# Patient Record
Sex: FEMALE | Race: BLACK OR AFRICAN AMERICAN | NOT HISPANIC OR LATINO | Employment: UNEMPLOYED | URBAN - METROPOLITAN AREA
[De-identification: names, ages, dates, MRNs, and addresses within clinical notes are randomized per-mention and may not be internally consistent; named-entity substitution may affect disease eponyms.]

---

## 2021-03-09 ENCOUNTER — HISTORICAL (OUTPATIENT)
Dept: ADMINISTRATIVE | Facility: HOSPITAL | Age: 76
End: 2021-03-09

## 2023-03-15 ENCOUNTER — OFFICE VISIT (OUTPATIENT)
Dept: FAMILY MEDICINE | Facility: CLINIC | Age: 78
End: 2023-03-15
Payer: MEDICARE

## 2023-03-15 VITALS
OXYGEN SATURATION: 100 % | HEART RATE: 86 BPM | SYSTOLIC BLOOD PRESSURE: 116 MMHG | HEIGHT: 65 IN | TEMPERATURE: 98 F | DIASTOLIC BLOOD PRESSURE: 68 MMHG | BODY MASS INDEX: 31 KG/M2 | RESPIRATION RATE: 18 BRPM | WEIGHT: 186.06 LBS

## 2023-03-15 DIAGNOSIS — Z11.59 NEED FOR HEPATITIS B SCREENING TEST: ICD-10-CM

## 2023-03-15 DIAGNOSIS — F01.A3 MILD VASCULAR DEMENTIA WITH MOOD DISTURBANCE: ICD-10-CM

## 2023-03-15 DIAGNOSIS — R41.9 UNSPECIFIED SYMPTOMS AND SIGNS INVOLVING COGNITIVE FUNCTIONS AND AWARENESS: ICD-10-CM

## 2023-03-15 DIAGNOSIS — E78.5 HYPERLIPIDEMIA, UNSPECIFIED HYPERLIPIDEMIA TYPE: ICD-10-CM

## 2023-03-15 DIAGNOSIS — G47.00 INSOMNIA, UNSPECIFIED: ICD-10-CM

## 2023-03-15 DIAGNOSIS — Z13.29 THYROID DISORDER SCREENING: ICD-10-CM

## 2023-03-15 DIAGNOSIS — E55.9 VITAMIN D DEFICIENCY: ICD-10-CM

## 2023-03-15 DIAGNOSIS — D64.9 ANEMIA, UNSPECIFIED TYPE: ICD-10-CM

## 2023-03-15 DIAGNOSIS — E11.51 TYPE 2 DIABETES MELLITUS WITH DIABETIC PERIPHERAL ANGIOPATHY WITHOUT GANGRENE, WITHOUT LONG-TERM CURRENT USE OF INSULIN: Primary | ICD-10-CM

## 2023-03-15 DIAGNOSIS — I10 PRIMARY HYPERTENSION: ICD-10-CM

## 2023-03-15 DIAGNOSIS — Z11.59 NEED FOR HEPATITIS C SCREENING TEST: ICD-10-CM

## 2023-03-15 LAB
ALBUMIN SERPL-MCNC: 4.2 G/DL (ref 3.4–4.8)
ALBUMIN/GLOB SERPL: 1.1 RATIO (ref 1.1–2)
ALP SERPL-CCNC: 47 UNIT/L (ref 40–150)
ALT SERPL-CCNC: 10 UNIT/L (ref 0–55)
APPEARANCE UR: CLEAR
AST SERPL-CCNC: 15 UNIT/L (ref 5–34)
BACTERIA #/AREA URNS AUTO: ABNORMAL /HPF
BASOPHILS # BLD AUTO: 0.03 X10(3)/MCL (ref 0–0.2)
BASOPHILS NFR BLD AUTO: 0.5 %
BILIRUB UR QL STRIP.AUTO: NEGATIVE MG/DL
BILIRUBIN DIRECT+TOT PNL SERPL-MCNC: 0.5 MG/DL
BUN SERPL-MCNC: 16.5 MG/DL (ref 9.8–20.1)
CALCIUM SERPL-MCNC: 10.1 MG/DL (ref 8.4–10.2)
CHLORIDE SERPL-SCNC: 104 MMOL/L (ref 98–107)
CHOLEST SERPL-MCNC: 377 MG/DL
CHOLEST/HDLC SERPL: 7 {RATIO} (ref 0–5)
CO2 SERPL-SCNC: 26 MMOL/L (ref 23–31)
COLOR UR AUTO: ABNORMAL
CREAT SERPL-MCNC: 0.88 MG/DL (ref 0.55–1.02)
CREAT UR-MCNC: 172.6 MG/DL (ref 47–110)
DEPRECATED CALCIDIOL+CALCIFEROL SERPL-MC: 35.9 NG/ML (ref 30–80)
EOSINOPHIL # BLD AUTO: 0.04 X10(3)/MCL (ref 0–0.9)
EOSINOPHIL NFR BLD AUTO: 0.7 %
ERYTHROCYTE [DISTWIDTH] IN BLOOD BY AUTOMATED COUNT: 16 % (ref 11.5–17)
EST. AVERAGE GLUCOSE BLD GHB EST-MCNC: 114 MG/DL
GFR SERPLBLD CREATININE-BSD FMLA CKD-EPI: >60 MLS/MIN/1.73/M2
GLOBULIN SER-MCNC: 3.7 GM/DL (ref 2.4–3.5)
GLUCOSE SERPL-MCNC: 91 MG/DL (ref 82–115)
GLUCOSE UR QL STRIP.AUTO: NORMAL MG/DL
HBA1C MFR BLD: 5.6 %
HBV CORE AB SERPL QL IA: NONREACTIVE
HBV SURFACE AG SERPL QL IA: NONREACTIVE
HCT VFR BLD AUTO: 31.9 % (ref 37–47)
HCV AB SERPL QL IA: NONREACTIVE
HDLC SERPL-MCNC: 51 MG/DL (ref 35–60)
HGB BLD-MCNC: 10.5 G/DL (ref 12–16)
HIV 1+2 AB+HIV1 P24 AG SERPL QL IA: NONREACTIVE
HYALINE CASTS #/AREA URNS LPF: ABNORMAL /LPF
IMM GRANULOCYTES # BLD AUTO: 0.01 X10(3)/MCL (ref 0–0.04)
IMM GRANULOCYTES NFR BLD AUTO: 0.2 %
KETONES UR QL STRIP.AUTO: NEGATIVE MG/DL
LDLC SERPL CALC-MCNC: 280 MG/DL (ref 50–140)
LEUKOCYTE ESTERASE UR QL STRIP.AUTO: NEGATIVE UNIT/L
LYMPHOCYTES # BLD AUTO: 1.11 X10(3)/MCL (ref 0.6–4.6)
LYMPHOCYTES NFR BLD AUTO: 18.7 %
MCH RBC QN AUTO: 26.8 PG
MCHC RBC AUTO-ENTMCNC: 32.9 G/DL (ref 33–36)
MCV RBC AUTO: 81.4 FL (ref 80–94)
MICROALBUMIN UR-MCNC: 47.8 UG/ML
MICROALBUMIN/CREAT RATIO PNL UR: 27.7 MG/GM CR (ref 0–30)
MONOCYTES # BLD AUTO: 0.43 X10(3)/MCL (ref 0.1–1.3)
MONOCYTES NFR BLD AUTO: 7.2 %
MUCOUS THREADS URNS QL MICRO: ABNORMAL /LPF
NEUTROPHILS # BLD AUTO: 4.32 X10(3)/MCL (ref 2.1–9.2)
NEUTROPHILS NFR BLD AUTO: 72.7 %
NITRITE UR QL STRIP.AUTO: NEGATIVE
NRBC BLD AUTO-RTO: 0 %
PH UR STRIP.AUTO: 6 [PH]
PLATELET # BLD AUTO: 350 X10(3)/MCL (ref 130–400)
PMV BLD AUTO: 9.7 FL (ref 7.4–10.4)
POTASSIUM SERPL-SCNC: 4.1 MMOL/L (ref 3.5–5.1)
PROT SERPL-MCNC: 7.9 GM/DL (ref 5.8–7.6)
PROT UR QL STRIP.AUTO: ABNORMAL MG/DL
RBC # BLD AUTO: 3.92 X10(6)/MCL (ref 4.2–5.4)
RBC #/AREA URNS AUTO: ABNORMAL /HPF
RBC UR QL AUTO: NEGATIVE UNIT/L
SODIUM SERPL-SCNC: 138 MMOL/L (ref 136–145)
SP GR UR STRIP.AUTO: 1.02
SQUAMOUS #/AREA URNS LPF: ABNORMAL /HPF
T PALLIDUM AB SER QL: NONREACTIVE
TRIGL SERPL-MCNC: 232 MG/DL (ref 37–140)
TSH SERPL-ACNC: 1.85 UIU/ML (ref 0.35–4.94)
UROBILINOGEN UR STRIP-ACNC: NORMAL MG/DL
VIT B12 SERPL-MCNC: 476 PG/ML (ref 213–816)
VLDLC SERPL CALC-MCNC: 46 MG/DL
WBC # SPEC AUTO: 5.9 X10(3)/MCL (ref 4.5–11.5)
WBC #/AREA URNS AUTO: ABNORMAL /HPF

## 2023-03-15 PROCEDURE — 80061 LIPID PANEL: CPT | Performed by: FAMILY MEDICINE

## 2023-03-15 PROCEDURE — 86780 TREPONEMA PALLIDUM: CPT | Performed by: FAMILY MEDICINE

## 2023-03-15 PROCEDURE — 84443 ASSAY THYROID STIM HORMONE: CPT | Performed by: FAMILY MEDICINE

## 2023-03-15 PROCEDURE — 82043 UR ALBUMIN QUANTITATIVE: CPT | Performed by: FAMILY MEDICINE

## 2023-03-15 PROCEDURE — 80053 COMPREHEN METABOLIC PANEL: CPT | Performed by: FAMILY MEDICINE

## 2023-03-15 PROCEDURE — 82607 VITAMIN B-12: CPT | Performed by: FAMILY MEDICINE

## 2023-03-15 PROCEDURE — 87389 HIV-1 AG W/HIV-1&-2 AB AG IA: CPT | Performed by: FAMILY MEDICINE

## 2023-03-15 PROCEDURE — 81001 URINALYSIS AUTO W/SCOPE: CPT | Performed by: FAMILY MEDICINE

## 2023-03-15 PROCEDURE — 87340 HEPATITIS B SURFACE AG IA: CPT | Performed by: FAMILY MEDICINE

## 2023-03-15 PROCEDURE — 86803 HEPATITIS C AB TEST: CPT | Performed by: FAMILY MEDICINE

## 2023-03-15 PROCEDURE — 36415 COLL VENOUS BLD VENIPUNCTURE: CPT | Performed by: FAMILY MEDICINE

## 2023-03-15 PROCEDURE — 82306 VITAMIN D 25 HYDROXY: CPT | Performed by: FAMILY MEDICINE

## 2023-03-15 PROCEDURE — 99214 OFFICE O/P EST MOD 30 MIN: CPT | Mod: PBBFAC | Performed by: FAMILY MEDICINE

## 2023-03-15 PROCEDURE — 83036 HEMOGLOBIN GLYCOSYLATED A1C: CPT | Performed by: FAMILY MEDICINE

## 2023-03-15 PROCEDURE — 85025 COMPLETE CBC W/AUTO DIFF WBC: CPT | Performed by: FAMILY MEDICINE

## 2023-03-15 PROCEDURE — 86704 HEP B CORE ANTIBODY TOTAL: CPT | Performed by: FAMILY MEDICINE

## 2023-03-15 RX ORDER — AMLODIPINE BESYLATE 5 MG/1
TABLET ORAL
COMMUNITY
Start: 2023-02-28 | End: 2023-12-06 | Stop reason: SDUPTHER

## 2023-03-15 RX ORDER — ATORVASTATIN CALCIUM 10 MG/1
10 TABLET, FILM COATED ORAL NIGHTLY
COMMUNITY
End: 2023-04-05 | Stop reason: SDUPTHER

## 2023-03-15 RX ORDER — CITALOPRAM 20 MG/1
TABLET, FILM COATED ORAL
COMMUNITY
Start: 2023-02-28 | End: 2023-10-10 | Stop reason: SDUPTHER

## 2023-03-15 RX ORDER — ASPIRIN 81 MG/1
81 TABLET ORAL DAILY
COMMUNITY

## 2023-03-15 RX ORDER — METFORMIN HYDROCHLORIDE 500 MG/1
500 TABLET, EXTENDED RELEASE ORAL
COMMUNITY
Start: 2023-03-07 | End: 2023-10-10 | Stop reason: ALTCHOICE

## 2023-03-15 RX ORDER — MIRTAZAPINE 15 MG/1
15 TABLET, FILM COATED ORAL NIGHTLY PRN
COMMUNITY
Start: 2023-02-28 | End: 2023-12-06 | Stop reason: SDUPTHER

## 2023-03-15 RX ORDER — BENAZEPRIL HYDROCHLORIDE 20 MG/1
TABLET ORAL
COMMUNITY
Start: 2023-02-28 | End: 2023-12-06 | Stop reason: SDUPTHER

## 2023-03-15 NOTE — PROGRESS NOTES
[facility]  [facility] Geriatric Clinic Note    DOS: 3/15/2023      Subjective:  Chief Complaint:    Chief Complaint   Patient presents with    Establish Care     Back pain.       History of Present Illness:  Gladys Dominguez is a 78 y.o. female with a PMH of HTN, HLD, DM2, depression, insomnia, chronic low back pain, CAD, chronic atrial fibrillation s/p internal subcutaneous device placement 2020  PVD with R femoral stent 2020    Who presents to geriatric clinic today for:  Establishing Care - New patient     Patient feels well and doing well overall.  Lives independently and otherwise independent of ADLs/IADLs.    Patient states she does feel forgetful of small things like where she places items, or why she walked into room.   Admits to forgetting to take medication from pill box some times.   She states she does not forget appointments,  remembers names.   She denies getting lost driving, but has now given up driving 2 years ago due to taxing on her energy, leg pain, cardiac condition, and vision.   Lives with granddaughter who drives her for all appointments and errands.      Has some pain with walking and mild swelling to right lower leg, she feels that is all due to her prior femoral stent.  Has not followed with vascular doctor recently.   Dr. Stephen Lau - cardiologist, patient states she has an implantable device with her atrial fibrillation, but does not know what it is, when questioned of watchman she states it did not sound familiar.  Patient does not have typical device location or palpation of AICD on exam.  Has upcoming appointment with them.     Past Medical History:   Diagnosis Date    Diabetes mellitus     Hypertension       History reviewed. No pertinent surgical history.   History reviewed. No pertinent family history.   Social History     Socioeconomic History    Marital status:     Number of children: 5   Tobacco Use    Smoking status: Never     Passive  exposure: Never    Smokeless tobacco: Never   Substance and Sexual Activity    Alcohol use: Never    Drug use: Never    Sexual activity: Not Currently        Review of patient's allergies indicates:  No Known Allergies       Current Outpatient Medications:     amLODIPine (NORVASC) 5 MG tablet, , Disp: , Rfl:     aspirin (ECOTRIN) 81 MG EC tablet, Take 81 mg by mouth once daily., Disp: , Rfl:     atorvastatin (LIPITOR) 10 MG tablet, Take 10 mg by mouth every evening., Disp: , Rfl:     benazepriL (LOTENSIN) 20 MG tablet, , Disp: , Rfl:     citalopram (CELEXA) 20 MG tablet, , Disp: , Rfl:     metFORMIN (GLUCOPHAGE-XR) 500 MG ER 24hr tablet, Take 500 mg by mouth., Disp: , Rfl:     mirtazapine (REMERON) 15 MG tablet, 15 mg nightly as needed., Disp: , Rfl:      Review of Systems   Constitutional:  Positive for weight loss. Negative for chills, diaphoresis, fever and malaise/fatigue.   HENT:  Negative for congestion, ear pain, hearing loss, sore throat and tinnitus.    Respiratory:  Positive for shortness of breath. Negative for cough, sputum production and wheezing.         Dyspnea on exertion   Cardiovascular:  Positive for claudication and leg swelling. Negative for chest pain and palpitations.        Right leg with stent placement ,intermittent   Gastrointestinal:  Negative for abdominal pain, blood in stool, constipation, diarrhea, heartburn, melena, nausea and vomiting.   Genitourinary:  Negative for dysuria, frequency, hematuria and urgency.   Musculoskeletal:  Positive for back pain, falls, joint pain and myalgias.        Left shoulder, has prior fracture    Near falls 3 weeks ago - tripped over grandsons shoes   Skin:  Negative for itching and rash.   Neurological:  Negative for dizziness, weakness and headaches.   Psychiatric/Behavioral:  Positive for memory loss. Negative for depression and suicidal ideas. The patient is not nervous/anxious and does not have insomnia.       Objective:   Vitals:    03/15/23 1318  "  BP: 116/68   BP Location: Left arm   Patient Position: Sitting   BP Method: Large (Automatic)   Pulse: 86   Resp: 18   Temp: 98.1 °F (36.7 °C)   TempSrc: Oral   SpO2: 100%   Weight: 84.4 kg (186 lb 1.1 oz)   Height: 5' 5" (1.651 m)        Physical Exam  Constitutional:       General: She is not in acute distress.     Appearance: Normal appearance. She is obese.   HENT:      Head: Normocephalic and atraumatic.      Right Ear: External ear normal.      Left Ear: External ear normal.      Nose: Nose normal. No congestion or rhinorrhea.      Mouth/Throat:      Mouth: Mucous membranes are moist.      Pharynx: Oropharynx is clear.   Eyes:      Extraocular Movements: Extraocular movements intact.      Conjunctiva/sclera: Conjunctivae normal.      Pupils: Pupils are equal, round, and reactive to light.   Cardiovascular:      Rate and Rhythm: Normal rate and regular rhythm.      Pulses: Normal pulses.      Heart sounds: Normal heart sounds. No murmur heard.  Pulmonary:      Effort: Pulmonary effort is normal. No respiratory distress.      Breath sounds: Normal breath sounds. No wheezing, rhonchi or rales.   Abdominal:      General: Bowel sounds are normal. There is no distension.      Palpations: Abdomen is soft.      Tenderness: There is no abdominal tenderness.   Musculoskeletal:         General: No tenderness. Normal range of motion.      Cervical back: Normal range of motion and neck supple.      Right lower leg: Edema present.      Left lower leg: No edema.   Skin:     General: Skin is warm and dry.   Neurological:      General: No focal deficit present.      Mental Status: She is alert and oriented to person, place, and time. Mental status is at baseline.      Motor: No weakness.   Psychiatric:         Mood and Affect: Mood normal.         Behavior: Behavior normal.    Mild 1+ pitting edema to above ankle in right leg only, left leg wnl      Geriatric Assessment:     Activities of Daily Living (ADLs):  Walking " independent  Transferring independent  Feeding independent  Bathing independent  Toileting independent  Dressing/Grooming independent    Instrumental Activities of Daily Living (IADLs):  Finances independent  Transportation fully dependent - granddaughter  Cooking independent  Cleaning/Laundry independent  Shopping independent  Telephone / Communication independent  Medications independent  Has pill box, admits she has forgets a couple times   Discussed granddaughter should help or or supervise her arrangement of meds in the box and help with daily reminders, plus put reminders on phone    Cognitive Assessment:  SLUMS performed date: 3/15/23 and scanned to patient's chart in media, Score 17/30  Mild dementia, otherwise functionally well    Depression Assessment:   Depression Patient Health Questionnaire 3/15/2023   Over the last two weeks how often have you been bothered by little interest or pleasure in doing things Not at all   Over the last two weeks how often have you been bothered by feeling down, depressed or hopeless Not at all   PHQ-2 Total Score 0       Mobility Assessment:   - Timed Up and Go (10 ft < 12 secs): Able  - Sit to  chair x 3 (without using arms): Not able  - Tandem stance and gait: Not able  - semi Tandem stance and gait: Able  - Side by Side stance (> 10 secs): Able  - One Legged stance: Not able  - Functional Reach (>7in/18cm): Not able    Assistive Devices:   none  Glasses: yes - pending cataract surgery this year   Hearing Aids: no  Dentures: yes    Social support/Living Situation: 5 Grandchildren live with her and help with IADLs in 1 story single house 3 bedroom    Polypharmacy Identified? (>9 meds) no    Advanced Care Planning:  - [place] POST: not done yet, counseled patient and information provided  - Medical POA: not done yet, counseled patient and information provided    Recent labs:  CBC:  Lab Results   Component Value Date    WBC 5.9 03/15/2023    RBC 3.92 (L) 03/15/2023    HGB  10.5 (L) 03/15/2023    HCT 31.9 (L) 03/15/2023    MCV 81.4 03/15/2023    MCH 26.8 03/15/2023    MCHC 32.9 (L) 03/15/2023    RDW 16.0 03/15/2023     03/15/2023    MPV 9.7 03/15/2023      CMP:  Sodium Level   Date Value Ref Range Status   03/15/2023 138 136 - 145 mmol/L Final     Potassium Level   Date Value Ref Range Status   03/15/2023 4.1 3.5 - 5.1 mmol/L Final     Carbon Dioxide   Date Value Ref Range Status   03/15/2023 26 23 - 31 mmol/L Final     Blood Urea Nitrogen   Date Value Ref Range Status   03/15/2023 16.5 9.8 - 20.1 mg/dL Final     Creatinine   Date Value Ref Range Status   03/15/2023 0.88 0.55 - 1.02 mg/dL Final     Calcium Level Total   Date Value Ref Range Status   03/15/2023 10.1 8.4 - 10.2 mg/dL Final     Albumin Level   Date Value Ref Range Status   03/15/2023 4.2 3.4 - 4.8 g/dL Final     Bilirubin Total   Date Value Ref Range Status   03/15/2023 0.5 <=1.5 mg/dL Final     Alkaline Phosphatase   Date Value Ref Range Status   03/15/2023 47 40 - 150 unit/L Final     Aspartate Aminotransferase   Date Value Ref Range Status   03/15/2023 15 5 - 34 unit/L Final     Alanine Aminotransferase   Date Value Ref Range Status   03/15/2023 10 0 - 55 unit/L Final      BMP:  Lab Results   Component Value Date     03/15/2023    K 4.1 03/15/2023    CO2 26 03/15/2023    BUN 16.5 03/15/2023    CREATININE 0.88 03/15/2023    CALCIUM 10.1 03/15/2023      Lipid Panel:  Lab Results   Component Value Date    CHOL 377 (H) 03/15/2023     Lab Results   Component Value Date    HDL 51 03/15/2023     No results found for: LDLCALC  Lab Results   Component Value Date    TRIG 232 (H) 03/15/2023     No results found for: CHOLHDL   HbA1c:  Lab Results   Component Value Date    HGBA1C 5.6 03/15/2023      TSH:  Lab Results   Component Value Date    TSH 1.854 03/15/2023       Recent Imaging:  No image results found.       Assessment & Plan:    Gladys Dominguez is presenting as above and will be treated as follows:    Gladys was seen  today for establish care.    Diagnoses and all orders for this visit:    Type 2 diabetes mellitus with diabetic peripheral angiopathy without gangrene, without long-term current use of insulin  -     Comprehensive Metabolic Panel; Future  -     Lipid Panel; Future  -     Hemoglobin A1C; Future  -     Microalbumin/Creatinine Ratio, Urine  -     Urinalysis, Reflex to Urine Culture  -     Hemoglobin A1C  -     Lipid Panel  -     Comprehensive Metabolic Panel    Mild vascular dementia with mood disturbance  -     CBC Auto Differential; Future  -     Comprehensive Metabolic Panel; Future  -     TSH; Future  -     Lipid Panel; Future  -     Hemoglobin A1C; Future  -     Vitamin D; Future  -     Vitamin B12; Future  -     HIV 1/2 Ag/Ab (4th Gen); Future  -     SYPHILIS ANTIBODY (WITH REFLEX RPR); Future  -     Hepatitis B Surface Antigen; Future  -     Hepatitis B Core Antibody, Total; Future  -     Hepatitis C Antibody; Future  -     Hepatitis C Antibody  -     Hepatitis B Core Antibody, Total  -     Hepatitis B Surface Antigen  -     SYPHILIS ANTIBODY (WITH REFLEX RPR)  -     HIV 1/2 Ag/Ab (4th Gen)  -     Vitamin B12  -     Vitamin D  -     Hemoglobin A1C  -     Lipid Panel  -     TSH  -     Comprehensive Metabolic Panel  -     CBC Auto Differential  Continue ASA 81mg daily     Vitamin D deficiency  -     Vitamin D; Future  -     Vitamin D    Primary hypertension  -     Urinalysis, Reflex to Urine Culture    Need for hepatitis C screening test  -     Hepatitis C Antibody; Future  -     Hepatitis C Antibody    Need for hepatitis B screening test  -     Hepatitis B Surface Antigen; Future  -     Hepatitis B Core Antibody, Total; Future  -     Hepatitis B Core Antibody, Total  -     Hepatitis B Surface Antigen    Hyperlipidemia, unspecified hyperlipidemia type  -     Lipid Panel; Future  -     Lipid Panel  Significantly elevated total chol, LDL, and TG noted.  Discussed with patient, she admits she was not previously  compliant on priov Atorvastatin 10mg dose.    We discussed with the level of lipids she has, 10mg even with compliance likely would not be enough, so increased to 40mg.    Will recheck lipids at next 3 month visit, if still significantly elevated, will increase to 80 vs switch to crestor 20mg, given CAD and PVD already, needs tighter control.      Thyroid disorder screening  -     TSH; Future  -     TSH    Insomnia, unspecified  -     TSH; Future  -     TSH    Unspecified symptoms and signs involving cognitive functions and awareness  -     Vitamin B12; Future  -     Vitamin B12    Anemia, unspecified type       Lab results reviewed after the visit, anemia noted, will need iron panel at next visit, B12 normal, also patient is due for follow up colonoscopy from prior screening, has GI specialist and will set up follow up appointment.   3 month follow up alainat     Nany Hansen MD  Attending - Family Medicine / Geriatric Medicine  [facility] [place], [facility]      Health Maintenance Reviewed:    There is no immunization history on file for this patient.

## 2023-04-06 RX ORDER — ATORVASTATIN CALCIUM 80 MG/1
80 TABLET, FILM COATED ORAL NIGHTLY
Qty: 90 TABLET | Refills: 1 | Status: SHIPPED | OUTPATIENT
Start: 2023-04-06 | End: 2023-12-06 | Stop reason: SDUPTHER

## 2023-06-19 ENCOUNTER — OFFICE VISIT (OUTPATIENT)
Dept: FAMILY MEDICINE | Facility: CLINIC | Age: 78
End: 2023-06-19
Payer: MEDICARE

## 2023-06-19 VITALS
DIASTOLIC BLOOD PRESSURE: 69 MMHG | OXYGEN SATURATION: 100 % | HEART RATE: 92 BPM | TEMPERATURE: 98 F | RESPIRATION RATE: 20 BRPM | WEIGHT: 188.94 LBS | SYSTOLIC BLOOD PRESSURE: 118 MMHG | BODY MASS INDEX: 31.48 KG/M2 | HEIGHT: 65 IN

## 2023-06-19 DIAGNOSIS — K64.4 INTERNAL AND EXTERNAL BLEEDING HEMORRHOIDS: ICD-10-CM

## 2023-06-19 DIAGNOSIS — R22.42 LOCALIZED SWELLING OF LEFT LOWER LEG: ICD-10-CM

## 2023-06-19 DIAGNOSIS — K62.5 BRIGHT RED RECTAL BLEEDING: Primary | ICD-10-CM

## 2023-06-19 DIAGNOSIS — K64.8 INTERNAL AND EXTERNAL BLEEDING HEMORRHOIDS: ICD-10-CM

## 2023-06-19 DIAGNOSIS — E78.5 HYPERLIPIDEMIA, UNSPECIFIED HYPERLIPIDEMIA TYPE: ICD-10-CM

## 2023-06-19 DIAGNOSIS — E86.0 DEHYDRATION: ICD-10-CM

## 2023-06-19 LAB
ANION GAP SERPL CALC-SCNC: 9 MEQ/L
BASOPHILS # BLD AUTO: 0.03 X10(3)/MCL
BASOPHILS NFR BLD AUTO: 0.5 %
BUN SERPL-MCNC: 18.7 MG/DL (ref 9.8–20.1)
CALCIUM SERPL-MCNC: 9.6 MG/DL (ref 8.4–10.2)
CHLORIDE SERPL-SCNC: 105 MMOL/L (ref 98–107)
CHOLEST SERPL-MCNC: 275 MG/DL
CHOLEST/HDLC SERPL: 7 {RATIO} (ref 0–5)
CO2 SERPL-SCNC: 25 MMOL/L (ref 23–31)
CREAT SERPL-MCNC: 0.94 MG/DL (ref 0.55–1.02)
CREAT/UREA NIT SERPL: 20
D DIMER PPP IA.FEU-MCNC: 1.28 UG/ML FEU (ref 0–0.5)
EOSINOPHIL # BLD AUTO: 0.06 X10(3)/MCL (ref 0–0.9)
EOSINOPHIL NFR BLD AUTO: 1.1 %
ERYTHROCYTE [DISTWIDTH] IN BLOOD BY AUTOMATED COUNT: 16.6 % (ref 11.5–17)
FERRITIN SERPL-MCNC: 148.32 NG/ML (ref 4.63–204)
GFR SERPLBLD CREATININE-BSD FMLA CKD-EPI: >60 MLS/MIN/1.73/M2
GLUCOSE SERPL-MCNC: 120 MG/DL (ref 82–115)
HCT VFR BLD AUTO: 28.6 % (ref 37–47)
HDLC SERPL-MCNC: 41 MG/DL (ref 35–60)
HGB BLD-MCNC: 9.6 G/DL (ref 12–16)
IMM GRANULOCYTES # BLD AUTO: 0.02 X10(3)/MCL (ref 0–0.04)
IMM GRANULOCYTES NFR BLD AUTO: 0.4 %
IRON SATN MFR SERPL: 23 % (ref 20–50)
IRON SERPL-MCNC: 58 UG/DL (ref 50–170)
LDLC SERPL CALC-MCNC: 194 MG/DL (ref 50–140)
LYMPHOCYTES # BLD AUTO: 1.09 X10(3)/MCL (ref 0.6–4.6)
LYMPHOCYTES NFR BLD AUTO: 19.6 %
MCH RBC QN AUTO: 27.4 PG (ref 27–31)
MCHC RBC AUTO-ENTMCNC: 33.6 G/DL (ref 33–36)
MCV RBC AUTO: 81.7 FL (ref 80–94)
MONOCYTES # BLD AUTO: 0.46 X10(3)/MCL (ref 0.1–1.3)
MONOCYTES NFR BLD AUTO: 8.3 %
NEUTROPHILS # BLD AUTO: 3.89 X10(3)/MCL (ref 2.1–9.2)
NEUTROPHILS NFR BLD AUTO: 70.1 %
NRBC BLD AUTO-RTO: 0 %
PLATELET # BLD AUTO: 361 X10(3)/MCL (ref 130–400)
PMV BLD AUTO: 10.1 FL (ref 7.4–10.4)
POTASSIUM SERPL-SCNC: 3.8 MMOL/L (ref 3.5–5.1)
RBC # BLD AUTO: 3.5 X10(6)/MCL (ref 4.2–5.4)
SODIUM SERPL-SCNC: 139 MMOL/L (ref 136–145)
TIBC SERPL-MCNC: 195 UG/DL (ref 70–310)
TIBC SERPL-MCNC: 253 UG/DL (ref 250–450)
TRANSFERRIN SERPL-MCNC: 213 MG/DL (ref 173–360)
TRIGL SERPL-MCNC: 199 MG/DL (ref 37–140)
VLDLC SERPL CALC-MCNC: 40 MG/DL
WBC # SPEC AUTO: 5.55 X10(3)/MCL (ref 4.5–11.5)

## 2023-06-19 PROCEDURE — 85025 COMPLETE CBC W/AUTO DIFF WBC: CPT | Performed by: FAMILY MEDICINE

## 2023-06-19 PROCEDURE — 82728 ASSAY OF FERRITIN: CPT | Performed by: FAMILY MEDICINE

## 2023-06-19 PROCEDURE — 36415 COLL VENOUS BLD VENIPUNCTURE: CPT | Performed by: FAMILY MEDICINE

## 2023-06-19 PROCEDURE — 83550 IRON BINDING TEST: CPT | Performed by: FAMILY MEDICINE

## 2023-06-19 PROCEDURE — 85379 FIBRIN DEGRADATION QUANT: CPT | Performed by: FAMILY MEDICINE

## 2023-06-19 PROCEDURE — 80048 BASIC METABOLIC PNL TOTAL CA: CPT | Performed by: FAMILY MEDICINE

## 2023-06-19 PROCEDURE — 80061 LIPID PANEL: CPT | Performed by: FAMILY MEDICINE

## 2023-06-19 PROCEDURE — 99214 OFFICE O/P EST MOD 30 MIN: CPT | Mod: PBBFAC | Performed by: FAMILY MEDICINE

## 2023-06-19 RX ORDER — HYDROCORTISONE ACETATE 25 MG/1
25 SUPPOSITORY RECTAL NIGHTLY
Qty: 24 SUPPOSITORY | Refills: 0 | Status: SHIPPED | OUTPATIENT
Start: 2023-06-19 | End: 2023-12-06

## 2023-06-19 RX ORDER — HYDROCORTISONE 25 MG/G
CREAM TOPICAL 2 TIMES DAILY
Qty: 20 G | Refills: 1 | Status: SHIPPED | OUTPATIENT
Start: 2023-06-19 | End: 2023-12-06

## 2023-06-19 NOTE — PROGRESS NOTES
"  [facility]    DOS: 6/19/2023      Subjective:  Chief Complaint:    Chief Complaint   Patient presents with    Diabetes     C/O rectal bleeding       History of Present Illness:  Gladys Dominguez is a 78 y.o. female with a PMH of HTN, HLD, depression, DM, insomnia.    Who presents today for:  Acute Concerns:  rectal bleeding and Follow up of Chronic Conditions: HTN, HLD    Patient had episode of rectal bleeding in the shower approx 1 month ago in may.   Several drops of blood that streamed In the drain, no BM or stool associated.  Initially thought it could be hemorrhoids, occasionally has bright red blood when wiping herself after Bms and has chronic constipation with aggravation recently.   Granddaughter examined her after this episode and thought she saw hemorrhoids/bulging from anus but no active bleeding. Denies pain with straining or other abdominal pain.  Has gas from constipation, taking dulcolax otc every 2 days which helps.  Drinking extra water to stay hydrated.    Has not had further bleeding episode since.   Denies dizziness or lightheadedness after, but notes feeling "winded" when doing strenuous or exertional activities.     Patient also known to have history of diverticulitis in the past and has not had colonoscopy in > 5 years.  Daughter previously told patient when they were supposed to have follow up study, there was a problem with the insurance and could not get it done.   Has established GI doctor, will make follow up appointment.     Also notes - Left leg swelling x approx 2 weeks, no injury no falls since previous visit.  Is sedentary most times in bed or recliner chair but does not lift legs when sitting, watching TV.  Denies pain, redness, or warmth,  but occasional cramping, but that is both legs and it improves with walking and rubbing/massaging muscles.    Of note has R leg femoral stent and follows vascular doctor Dr. Lau, " "cancelled previous follow up so needs to make another appointment. .       Past Medical History:   Diagnosis Date    Diabetes mellitus     Diabetes mellitus, type 2     Hypertension       No past surgical history on file.   No family history on file.   Social History     Socioeconomic History    Marital status:     Number of children: 3   Occupational History    Occupation: retired   Tobacco Use    Smoking status: Never     Passive exposure: Never    Smokeless tobacco: Never   Substance and Sexual Activity    Alcohol use: Never    Drug use: Never    Sexual activity: Not Currently     Partners: Male        Review of patient's allergies indicates:  No Known Allergies     Current Outpatient Medications:     amLODIPine (NORVASC) 5 MG tablet, , Disp: , Rfl:     aspirin (ECOTRIN) 81 MG EC tablet, Take 81 mg by mouth once daily., Disp: , Rfl:     atorvastatin (LIPITOR) 80 MG tablet, Take 1 tablet (80 mg total) by mouth every evening., Disp: 90 tablet, Rfl: 1    benazepriL (LOTENSIN) 20 MG tablet, , Disp: , Rfl:     citalopram (CELEXA) 20 MG tablet, , Disp: , Rfl:     metFORMIN (GLUCOPHAGE-XR) 500 MG ER 24hr tablet, Take 500 mg by mouth., Disp: , Rfl:     mirtazapine (REMERON) 15 MG tablet, 15 mg nightly as needed., Disp: , Rfl:      Review of Systems:  Review of Systems   Constitutional:  Negative for chills, fever and weight loss.   HENT:  Negative for congestion, hearing loss and sore throat.    Eyes:  Negative for blurred vision.   Respiratory:  Negative for cough, sputum production and shortness of breath.         Feels "winded" on exertion but not shortness of breath per say    Cardiovascular:  Positive for leg swelling. Negative for chest pain and palpitations.   Gastrointestinal:  Positive for blood in stool and constipation. Negative for abdominal pain, diarrhea, heartburn, nausea and vomiting.   Genitourinary:  Negative for dysuria, frequency and urgency.   Musculoskeletal:  Positive for myalgias. Negative " "for back pain, falls and joint pain.   Neurological:  Positive for weakness. Negative for dizziness, focal weakness and headaches.   Psychiatric/Behavioral:  Negative for depression and memory loss. The patient is not nervous/anxious and does not have insomnia.       Objective:   Vitals:    06/19/23 1155   BP: 118/69   BP Location: Right arm   Patient Position: Sitting   BP Method: Large (Automatic)   Pulse: 92   Resp: 20   Temp: 98.2 °F (36.8 °C)   TempSrc: Oral   SpO2: 100%   Weight: 85.7 kg (188 lb 15 oz)   Height: 5' 5" (1.651 m)        Physical Exam  Vitals reviewed. Exam conducted with a chaperone present.   Constitutional:       General: She is not in acute distress.     Appearance: Normal appearance. She is obese.   HENT:      Head: Normocephalic and atraumatic.      Right Ear: External ear normal.      Left Ear: External ear normal.      Nose: Nose normal. No congestion or rhinorrhea.      Mouth/Throat:      Mouth: Mucous membranes are moist.      Pharynx: Oropharynx is clear. No oropharyngeal exudate or posterior oropharyngeal erythema.   Eyes:      Extraocular Movements: Extraocular movements intact.      Conjunctiva/sclera: Conjunctivae normal.      Pupils: Pupils are equal, round, and reactive to light.   Cardiovascular:      Rate and Rhythm: Tachycardia present.      Pulses: Normal pulses.      Heart sounds: Normal heart sounds. No murmur heard.     Comments: HR 92 but fast on auscultative exam.   Pulmonary:      Effort: Pulmonary effort is normal. No respiratory distress.      Breath sounds: Normal breath sounds. No wheezing or rhonchi.   Abdominal:      General: Abdomen is flat. Bowel sounds are normal. There is no distension.      Palpations: Abdomen is soft.      Tenderness: There is no abdominal tenderness.   Genitourinary:     Exam position: Knee-chest position.      Rectum: External hemorrhoid and internal hemorrhoid present. No mass, tenderness or anal fissure. Normal anal tone.      Comments: " External hemorrhoid with skin tag and mild bulging to approx 10oclock position.  No erythema or tenderness, Small amount of stool residual on exam - normal mid brown color not black no bright red blood noted.   Musculoskeletal:         General: Swelling present. No tenderness. Normal range of motion.      Cervical back: Normal range of motion and neck supple.      Right lower leg: No edema.      Left lower leg: Edema present.   Skin:     General: Skin is warm and dry.      Capillary Refill: Capillary refill takes less than 2 seconds.   Neurological:      General: No focal deficit present.      Mental Status: She is alert and oriented to person, place, and time. Mental status is at baseline.      Motor: No weakness.   Psychiatric:         Mood and Affect: Mood normal.         Behavior: Behavior normal.         Thought Content: Thought content normal.         Judgment: Judgment normal.        Recent labs:  CBC:  Lab Results   Component Value Date    WBC 5.9 03/15/2023    RBC 3.92 (L) 03/15/2023    HGB 10.5 (L) 03/15/2023    HCT 31.9 (L) 03/15/2023    MCV 81.4 03/15/2023    MCH 26.8 03/15/2023    MCHC 32.9 (L) 03/15/2023    RDW 16.0 03/15/2023     03/15/2023    MPV 9.7 03/15/2023      CMP:  Sodium Level   Date Value Ref Range Status   03/15/2023 138 136 - 145 mmol/L Final     Potassium Level   Date Value Ref Range Status   03/15/2023 4.1 3.5 - 5.1 mmol/L Final     Carbon Dioxide   Date Value Ref Range Status   03/15/2023 26 23 - 31 mmol/L Final     Blood Urea Nitrogen   Date Value Ref Range Status   03/15/2023 16.5 9.8 - 20.1 mg/dL Final     Creatinine   Date Value Ref Range Status   03/15/2023 0.88 0.55 - 1.02 mg/dL Final     Calcium Level Total   Date Value Ref Range Status   03/15/2023 10.1 8.4 - 10.2 mg/dL Final     Albumin Level   Date Value Ref Range Status   03/15/2023 4.2 3.4 - 4.8 g/dL Final     Bilirubin Total   Date Value Ref Range Status   03/15/2023 0.5 <=1.5 mg/dL Final     Alkaline Phosphatase    Date Value Ref Range Status   03/15/2023 47 40 - 150 unit/L Final     Aspartate Aminotransferase   Date Value Ref Range Status   03/15/2023 15 5 - 34 unit/L Final     Alanine Aminotransferase   Date Value Ref Range Status   03/15/2023 10 0 - 55 unit/L Final      BMP:  Lab Results   Component Value Date     03/15/2023    K 4.1 03/15/2023    CO2 26 03/15/2023    BUN 16.5 03/15/2023    CREATININE 0.88 03/15/2023    CALCIUM 10.1 03/15/2023      Lipid Panel:  Lab Results   Component Value Date    CHOL 377 (H) 03/15/2023     Lab Results   Component Value Date    HDL 51 03/15/2023     No results found for: LDLCALC  Lab Results   Component Value Date    TRIG 232 (H) 03/15/2023     No results found for: CHOLHDL   HbA1c:  Lab Results   Component Value Date    HGBA1C 5.6 03/15/2023      TSH:  Lab Results   Component Value Date    TSH 1.854 03/15/2023     Assessment & Plan:    Gladys Dominguez is presenting as above and will be treated as follows:    Gladys was seen today for diabetes.    Diagnoses and all orders for this visit:    Bright red rectal bleeding  -     Cancel: CBC Auto Differential; Future  -     Cancel: Basic Metabolic Panel; Future  -     Cancel: Iron and TIBC; Future  -     Cancel: Ferritin; Future  -     Basic Metabolic Panel; Future  -     CBC Auto Differential; Future  -     Ferritin; Future  -     Iron and TIBC; Future  -     hydrocortisone 2.5 % cream; Apply topically 2 (two) times daily.  -     hydrocortisone (ANUSOL-HC) 25 mg suppository; Place 1 suppository (25 mg total) rectally every evening.  -     Iron and TIBC  -     Ferritin  -     CBC Auto Differential  -     Basic Metabolic Panel    Dehydration  -     Cancel: Basic Metabolic Panel; Future  -     Basic Metabolic Panel; Future  -     Basic Metabolic Panel    Localized swelling of left lower leg  -     D-Dimer, Quantitative; Future  -     D-Dimer, Quantitative    Hyperlipidemia, unspecified hyperlipidemia type  -     Lipid Panel; Future  -      Lipid Panel    Internal and external bleeding hemorrhoids  -     hydrocortisone 2.5 % cream; Apply topically 2 (two) times daily.  -     hydrocortisone (ANUSOL-HC) 25 mg suppository; Place 1 suppository (25 mg total) rectally every evening.    Patient to make GI urgent follow up, need colonoscopy for prior diveriticulitis follow up as well as cancer screening,  can possibly get hemorrhoid banding if needed.  ED precautions given if further bleeding episodes, weakness, dizzy/lightheadedness.     Patient to use hemorrhoid cream external and suppository internal x 2 weeks, while awaiting follow up.   For leg swelling, due to incongruency from left to right, but no erythema, warmth, or pain/tenderness on exam - will obtain D-Dimer first, prior to ultrasound to rule out DVT.  Could also be vascular insufficiency as patient had stenting on R but not Left.  Patient notes she gets BLE ultrasounds done with vascular doctor and has upcoming follow up.  Pending blood work results, will recommend patient accordingly.     Health Maintenance Reviewed:    Hepatitis Screening:   Lab Results   Component Value Date    HEPBCAB Nonreactive 03/15/2023    HEPCAB Nonreactive 03/15/2023        Will need records from prior PCP and will do in depth review of health maintenance at following visit.      RTC 6 weeks for rectal bleeding and leg swelling follow up    Nany Hansen MD  Attending - Family Medicine / Geriatric Medicine  [facility] [place], [facility]

## 2023-10-10 ENCOUNTER — OFFICE VISIT (OUTPATIENT)
Dept: FAMILY MEDICINE | Facility: CLINIC | Age: 78
End: 2023-10-10
Payer: MEDICARE

## 2023-10-10 VITALS
TEMPERATURE: 98 F | HEIGHT: 65 IN | HEART RATE: 85 BPM | OXYGEN SATURATION: 100 % | BODY MASS INDEX: 30.85 KG/M2 | WEIGHT: 185.19 LBS | DIASTOLIC BLOOD PRESSURE: 77 MMHG | RESPIRATION RATE: 20 BRPM | SYSTOLIC BLOOD PRESSURE: 137 MMHG

## 2023-10-10 DIAGNOSIS — F41.9 ANXIETY: ICD-10-CM

## 2023-10-10 DIAGNOSIS — Z23 NEED FOR INFLUENZA VACCINATION: Primary | ICD-10-CM

## 2023-10-10 PROCEDURE — G0008 ADMIN INFLUENZA VIRUS VAC: HCPCS | Mod: PBBFAC

## 2023-10-10 PROCEDURE — 90694 VACC AIIV4 NO PRSRV 0.5ML IM: CPT | Mod: PBBFAC

## 2023-10-10 PROCEDURE — 99213 OFFICE O/P EST LOW 20 MIN: CPT | Mod: PBBFAC

## 2023-10-10 RX ORDER — CITALOPRAM 20 MG/1
20 TABLET, FILM COATED ORAL DAILY
Qty: 90 TABLET | Refills: 1 | Status: SHIPPED | OUTPATIENT
Start: 2023-10-10 | End: 2023-12-06 | Stop reason: SDUPTHER

## 2023-10-10 RX ADMIN — INFLUENZA A VIRUS A/VICTORIA/4897/2022 IVR-238 (H1N1) ANTIGEN (FORMALDEHYDE INACTIVATED), INFLUENZA A VIRUS A/DARWIN/6/2021 IVR-227 (H3N2) ANTIGEN (FORMALDEHYDE INACTIVATED), INFLUENZA B VIRUS B/AUSTRIA/1359417/2021 BVR-26 ANTIGEN (FORMALDEHYDE INACTIVATED), INFLUENZA B VIRUS B/PHUKET/3073/2013 BVR-1B ANTIGEN (FORMALDEHYDE INACTIVATED) 0.5 ML: 15; 15; 15; 15 INJECTION, SUSPENSION INTRAMUSCULAR at 03:10

## 2023-10-10 NOTE — PROGRESS NOTES
[facility]    DOS: 10/10/2023      Subjective:  Chief Complaint:    Chief Complaint   Patient presents with    Diabetes    Hypertension    Hyperlipidemia     FU       History of Present Illness:  Gladys Dominguez is a 78 y.o. female with a PMH of HTN, HLD, depression, DM, insomnia.    Who presents today for: follow up    Since last visit rectal bleeding has resolved with medications prescribed at last visit. No more bright blood noted per stool. Additionally lower extremity swelling on the left has improved but does occur intermittently. Otherwise, additionally has complaints of chronic back and shoulder pain that has not changed significantly.        Past Medical History:   Diagnosis Date    Diabetes mellitus     Diabetes mellitus, type 2     Hypertension       No past surgical history on file.   No family history on file.   Social History     Socioeconomic History    Marital status:     Number of children: 3   Occupational History    Occupation: retired   Tobacco Use    Smoking status: Never     Passive exposure: Never    Smokeless tobacco: Never   Substance and Sexual Activity    Alcohol use: Never    Drug use: Never    Sexual activity: Not Currently     Partners: Male        Review of patient's allergies indicates:  No Known Allergies     Current Outpatient Medications:     amLODIPine (NORVASC) 5 MG tablet, , Disp: , Rfl:     aspirin (ECOTRIN) 81 MG EC tablet, Take 81 mg by mouth once daily., Disp: , Rfl:     atorvastatin (LIPITOR) 80 MG tablet, Take 1 tablet (80 mg total) by mouth every evening., Disp: 90 tablet, Rfl: 1    benazepriL (LOTENSIN) 20 MG tablet, , Disp: , Rfl:     citalopram (CELEXA) 20 MG tablet, , Disp: , Rfl:     hydrocortisone (ANUSOL-HC) 25 mg suppository, Place 1 suppository (25 mg total) rectally every evening., Disp: 24 suppository, Rfl: 0    hydrocortisone 2.5 % cream, Apply topically 2 (two) times daily., Disp: 20 g, Rfl: 1    " metFORMIN (GLUCOPHAGE-XR) 500 MG ER 24hr tablet, Take 500 mg by mouth., Disp: , Rfl:     mirtazapine (REMERON) 15 MG tablet, 15 mg nightly as needed., Disp: , Rfl:      Review of Systems:  Review of Systems   Constitutional:  Negative for chills, fever and weight loss.   HENT:  Negative for congestion, hearing loss and sore throat.    Eyes:  Negative for blurred vision.   Respiratory:  Negative for cough, sputum production and shortness of breath.    Cardiovascular:  Negative for chest pain, palpitations and leg swelling.   Gastrointestinal:  Negative for abdominal pain, blood in stool, constipation, diarrhea, heartburn, nausea and vomiting.   Genitourinary:  Negative for dysuria, frequency and urgency.   Musculoskeletal:  Positive for back pain. Negative for falls, joint pain and myalgias.        Bilateral shoulder pain   Neurological:  Negative for dizziness, focal weakness, weakness and headaches.   Psychiatric/Behavioral:  Negative for depression and memory loss. The patient is not nervous/anxious and does not have insomnia.         Objective:   Vitals:    10/10/23 1340   BP: 137/77   BP Location: Right arm   Patient Position: Sitting   BP Method: Large (Automatic)   Pulse: 85   Resp: 20   Temp: 98.4 °F (36.9 °C)   TempSrc: Oral   SpO2: 100%   Weight: 84 kg (185 lb 3 oz)   Height: 5' 5" (1.651 m)        Physical Exam  Vitals reviewed.   Constitutional:       General: She is not in acute distress.     Appearance: Normal appearance. She is obese.   HENT:      Head: Normocephalic and atraumatic.      Mouth/Throat:      Mouth: Mucous membranes are moist.      Pharynx: Oropharynx is clear.   Eyes:      Extraocular Movements: Extraocular movements intact.      Conjunctiva/sclera: Conjunctivae normal.      Pupils: Pupils are equal, round, and reactive to light.   Cardiovascular:      Rate and Rhythm: Normal rate and regular rhythm.      Pulses: Normal pulses.      Heart sounds: Normal heart sounds. No murmur heard.    "  Comments: HR 92 but fast on auscultative exam.   Pulmonary:      Effort: Pulmonary effort is normal. No respiratory distress.      Breath sounds: Normal breath sounds. No wheezing or rhonchi.   Abdominal:      General: Abdomen is flat. Bowel sounds are normal. There is no distension.      Palpations: Abdomen is soft.      Tenderness: There is no abdominal tenderness.   Musculoskeletal:         General: No swelling or tenderness. Normal range of motion.      Cervical back: Normal range of motion.      Right lower leg: No edema.      Left lower leg: No edema.   Skin:     General: Skin is warm and dry.      Capillary Refill: Capillary refill takes less than 2 seconds.   Neurological:      General: No focal deficit present.      Mental Status: She is alert and oriented to person, place, and time. Mental status is at baseline.      Motor: No weakness.   Psychiatric:         Mood and Affect: Mood normal.         Behavior: Behavior normal.         Thought Content: Thought content normal.         Judgment: Judgment normal.        Recent labs:  CBC:  Lab Results   Component Value Date    WBC 5.55 06/19/2023    RBC 3.50 (L) 06/19/2023    HGB 9.6 (L) 06/19/2023    HCT 28.6 (L) 06/19/2023    MCV 81.7 06/19/2023    MCH 27.4 06/19/2023    MCHC 33.6 06/19/2023    RDW 16.6 06/19/2023     06/19/2023    MPV 10.1 06/19/2023      CMP:  Sodium   Date Value Ref Range Status   08/08/2020 137 136 - 145 mmol/L Final     Sodium Level   Date Value Ref Range Status   06/19/2023 139 136 - 145 mmol/L Final     Potassium   Date Value Ref Range Status   08/08/2020 4.4 3.5 - 5.1 mmol/L Final     Potassium Level   Date Value Ref Range Status   06/19/2023 3.8 3.5 - 5.1 mmol/L Final     Chloride   Date Value Ref Range Status   08/08/2020 104 100 - 109 mmol/L Final     Carbon Dioxide   Date Value Ref Range Status   06/19/2023 25 23 - 31 mmol/L Final   08/08/2020 24 22 - 33 mmol/L Final     Blood Urea Nitrogen   Date Value Ref Range Status  "  06/19/2023 18.7 9.8 - 20.1 mg/dL Final   08/08/2020 22 5 - 25 mg/dL Final     Creatinine   Date Value Ref Range Status   06/19/2023 0.94 0.55 - 1.02 mg/dL Final   08/08/2020 0.70 0.57 - 1.25 mg/dL Final     Calcium   Date Value Ref Range Status   08/08/2020 8.6 (L) 8.8 - 10.6 mg/dL Final     Calcium Level Total   Date Value Ref Range Status   06/19/2023 9.6 8.4 - 10.2 mg/dL Final     Albumin Level   Date Value Ref Range Status   03/15/2023 4.2 3.4 - 4.8 g/dL Final     Bilirubin Total   Date Value Ref Range Status   03/15/2023 0.5 <=1.5 mg/dL Final     Alkaline Phosphatase   Date Value Ref Range Status   03/15/2023 47 40 - 150 unit/L Final     Aspartate Aminotransferase   Date Value Ref Range Status   03/15/2023 15 5 - 34 unit/L Final     Alanine Aminotransferase   Date Value Ref Range Status   03/15/2023 10 0 - 55 unit/L Final     Anion Gap   Date Value Ref Range Status   08/08/2020 9 8 - 16 mmol/L Final     eGFR    Date Value Ref Range Status   08/08/2020 99 >=60 mL/min/1.73mSq Final      BMP:  Lab Results   Component Value Date     06/19/2023    K 3.8 06/19/2023     08/08/2020    CO2 25 06/19/2023    BUN 18.7 06/19/2023    CREATININE 0.94 06/19/2023    CALCIUM 9.6 06/19/2023    ANIONGAP 9 08/08/2020    ESTGFRAFRICA 99 08/08/2020      Lipid Panel:  Lab Results   Component Value Date    CHOL 275 (H) 06/19/2023    CHOL 377 (H) 03/15/2023     Lab Results   Component Value Date    HDL 41 06/19/2023    HDL 51 03/15/2023     No results found for: "LDLCALC"  Lab Results   Component Value Date    TRIG 199 (H) 06/19/2023    TRIG 232 (H) 03/15/2023     No results found for: "CHOLHDL"   HbA1c:  Lab Results   Component Value Date    HGBA1C 5.6 03/15/2023      TSH:  Lab Results   Component Value Date    TSH 1.854 03/15/2023     Assessment & Plan:    Gladys Dominguez is presenting as above and will be treated as follows:    Call GI doctor to set appointment.   To get Records from Dr. Chase (Cardiology) " in regards to Ultrasound of Lower extremities  To get records from former PCP for Health maintenance  Educated on stopping ibuprofen (NSAIDS) for chronic pain and to replace with acetaminophen as needed  Can stop Metformin at this point with well controlled sugars and A1c but will regroup at next visit to ensure not trending back up  Continue to to take Lipitor 80. Will regroup on this at next visit as well with repeat Fasting Lipid panel.       Health Maintenance Reviewed:    Hepatitis Screening:   Lab Results   Component Value Date    HEPBCAB Nonreactive 03/15/2023    HEPCAB Nonreactive 03/15/2023        RTC 2mo    Luther Glynn, DO  Internal Medicine - PGY-3

## 2023-10-10 NOTE — PATIENT INSTRUCTIONS
For pain - try NOT to use ibuprofen(Advil) or naproxen (aleve) - it will cause increased bleeding risk.    Safe to use acetaminophen (tylenol) 1,000mg (2 extra strength tabs) twice a day as needed for pain.      Reminder - call your gastroenterology doctor for follow up appointment - ask about when you are due for next colonoscopy, and mention recent bleeding from the rectum with bowel movements and hemorrhoids, and your history of diverticulosis.     Please come fasting in the morning - okay to drink water -  for your labwork before your December appointment

## 2023-11-28 NOTE — PROGRESS NOTES
Date of Service: 10/10/2023    Attending Attestation: Patient discussed with resident. The chart was reviewed thoroughly including pertinent vitals, labs, imaging, medications, prior notes, and consultant/specialist recommendations.  I participated in the management of the patient, examined the patient, reviewed the summary of the plan, and was immediately available at all times throughout the encounter. Services were furnished in a primary care center located in the outpatient department of a [facility] hospital. I agree with the resident's findings and plan as documented in the resident's note.    Nany Hansen MD  Attending - Family Medicine / Geriatric Medicine  [facility] [place], [facility]

## 2023-12-06 ENCOUNTER — OFFICE VISIT (OUTPATIENT)
Dept: FAMILY MEDICINE | Facility: CLINIC | Age: 78
End: 2023-12-06
Payer: MEDICARE

## 2023-12-06 VITALS
TEMPERATURE: 99 F | BODY MASS INDEX: 30.85 KG/M2 | HEIGHT: 65 IN | DIASTOLIC BLOOD PRESSURE: 77 MMHG | SYSTOLIC BLOOD PRESSURE: 135 MMHG | OXYGEN SATURATION: 99 % | WEIGHT: 185.19 LBS | HEART RATE: 74 BPM | RESPIRATION RATE: 20 BRPM

## 2023-12-06 DIAGNOSIS — Z12.31 BREAST CANCER SCREENING BY MAMMOGRAM: ICD-10-CM

## 2023-12-06 DIAGNOSIS — G89.29 CHRONIC MIDLINE LOW BACK PAIN WITHOUT SCIATICA: ICD-10-CM

## 2023-12-06 DIAGNOSIS — E78.00 HIGH CHOLESTEROL: Primary | ICD-10-CM

## 2023-12-06 DIAGNOSIS — F41.9 ANXIETY: ICD-10-CM

## 2023-12-06 DIAGNOSIS — D64.9 ANEMIA, UNSPECIFIED TYPE: ICD-10-CM

## 2023-12-06 DIAGNOSIS — M54.50 CHRONIC MIDLINE LOW BACK PAIN WITHOUT SCIATICA: ICD-10-CM

## 2023-12-06 DIAGNOSIS — I10 ESSENTIAL HYPERTENSION: ICD-10-CM

## 2023-12-06 DIAGNOSIS — E11.51 TYPE 2 DIABETES MELLITUS WITH DIABETIC PERIPHERAL ANGIOPATHY WITHOUT GANGRENE, WITHOUT LONG-TERM CURRENT USE OF INSULIN: ICD-10-CM

## 2023-12-06 DIAGNOSIS — G47.00 INSOMNIA, UNSPECIFIED TYPE: ICD-10-CM

## 2023-12-06 PROBLEM — I21.4 NSTEMI (NON-ST ELEVATED MYOCARDIAL INFARCTION): Status: RESOLVED | Noted: 2020-08-04 | Resolved: 2023-12-06

## 2023-12-06 PROBLEM — I25.2 HISTORY OF MI (MYOCARDIAL INFARCTION): Status: ACTIVE | Noted: 2023-12-06

## 2023-12-06 PROBLEM — I25.10 ARTERIOSCLEROSIS OF CORONARY ARTERY: Status: ACTIVE | Noted: 2023-12-06

## 2023-12-06 PROBLEM — E11.9 DIABETES MELLITUS: Status: ACTIVE | Noted: 2023-12-06

## 2023-12-06 PROBLEM — I21.4 NSTEMI (NON-ST ELEVATED MYOCARDIAL INFARCTION): Status: ACTIVE | Noted: 2020-08-04

## 2023-12-06 PROBLEM — I48.0 PAROXYSMAL ATRIAL FIBRILLATION: Status: ACTIVE | Noted: 2023-12-06

## 2023-12-06 LAB
ALBUMIN SERPL-MCNC: 3.8 G/DL (ref 3.4–4.8)
ALBUMIN/GLOB SERPL: 1.1 RATIO (ref 1.1–2)
ALP SERPL-CCNC: 49 UNIT/L (ref 40–150)
ALT SERPL-CCNC: 8 UNIT/L (ref 0–55)
AST SERPL-CCNC: 15 UNIT/L (ref 5–34)
BASOPHILS # BLD AUTO: 0.03 X10(3)/MCL
BASOPHILS NFR BLD AUTO: 0.6 %
BILIRUB SERPL-MCNC: 0.6 MG/DL
BUN SERPL-MCNC: 17 MG/DL (ref 9.8–20.1)
CALCIUM SERPL-MCNC: 9.4 MG/DL (ref 8.4–10.2)
CHLORIDE SERPL-SCNC: 105 MMOL/L (ref 98–107)
CHOLEST SERPL-MCNC: 234 MG/DL
CHOLEST/HDLC SERPL: 6 {RATIO} (ref 0–5)
CO2 SERPL-SCNC: 28 MMOL/L (ref 23–31)
CREAT SERPL-MCNC: 0.91 MG/DL (ref 0.55–1.02)
EOSINOPHIL # BLD AUTO: 0.06 X10(3)/MCL (ref 0–0.9)
EOSINOPHIL NFR BLD AUTO: 1.2 %
ERYTHROCYTE [DISTWIDTH] IN BLOOD BY AUTOMATED COUNT: 16.5 % (ref 11.5–17)
EST. AVERAGE GLUCOSE BLD GHB EST-MCNC: 122.6 MG/DL
GFR SERPLBLD CREATININE-BSD FMLA CKD-EPI: >60 MLS/MIN/1.73/M2
GLOBULIN SER-MCNC: 3.6 GM/DL (ref 2.4–3.5)
GLUCOSE SERPL-MCNC: 86 MG/DL (ref 82–115)
HBA1C MFR BLD: 5.9 %
HCT VFR BLD AUTO: 32.1 % (ref 37–47)
HDLC SERPL-MCNC: 42 MG/DL (ref 35–60)
HGB BLD-MCNC: 10.3 G/DL (ref 12–16)
IMM GRANULOCYTES # BLD AUTO: 0.01 X10(3)/MCL (ref 0–0.04)
IMM GRANULOCYTES NFR BLD AUTO: 0.2 %
LDLC SERPL CALC-MCNC: 162 MG/DL (ref 50–140)
LYMPHOCYTES # BLD AUTO: 1.05 X10(3)/MCL (ref 0.6–4.6)
LYMPHOCYTES NFR BLD AUTO: 20.2 %
MCH RBC QN AUTO: 26.2 PG (ref 27–31)
MCHC RBC AUTO-ENTMCNC: 32.1 G/DL (ref 33–36)
MCV RBC AUTO: 81.7 FL (ref 80–94)
MONOCYTES # BLD AUTO: 0.4 X10(3)/MCL (ref 0.1–1.3)
MONOCYTES NFR BLD AUTO: 7.7 %
NEUTROPHILS # BLD AUTO: 3.64 X10(3)/MCL (ref 2.1–9.2)
NEUTROPHILS NFR BLD AUTO: 70.1 %
NRBC BLD AUTO-RTO: 0 %
PLATELET # BLD AUTO: 326 X10(3)/MCL (ref 130–400)
PMV BLD AUTO: 10.1 FL (ref 7.4–10.4)
POTASSIUM SERPL-SCNC: 3.9 MMOL/L (ref 3.5–5.1)
PROT SERPL-MCNC: 7.4 GM/DL (ref 5.8–7.6)
RBC # BLD AUTO: 3.93 X10(6)/MCL (ref 4.2–5.4)
SODIUM SERPL-SCNC: 141 MMOL/L (ref 136–145)
TRIGL SERPL-MCNC: 151 MG/DL (ref 37–140)
VLDLC SERPL CALC-MCNC: 30 MG/DL
WBC # SPEC AUTO: 5.19 X10(3)/MCL (ref 4.5–11.5)

## 2023-12-06 PROCEDURE — 99215 OFFICE O/P EST HI 40 MIN: CPT | Mod: PBBFAC | Performed by: FAMILY MEDICINE

## 2023-12-06 PROCEDURE — 83036 HEMOGLOBIN GLYCOSYLATED A1C: CPT | Performed by: FAMILY MEDICINE

## 2023-12-06 PROCEDURE — 85025 COMPLETE CBC W/AUTO DIFF WBC: CPT | Performed by: FAMILY MEDICINE

## 2023-12-06 PROCEDURE — 36415 COLL VENOUS BLD VENIPUNCTURE: CPT | Performed by: FAMILY MEDICINE

## 2023-12-06 PROCEDURE — 80061 LIPID PANEL: CPT | Performed by: FAMILY MEDICINE

## 2023-12-06 PROCEDURE — 80053 COMPREHEN METABOLIC PANEL: CPT | Performed by: FAMILY MEDICINE

## 2023-12-06 RX ORDER — MIRTAZAPINE 15 MG/1
15 TABLET, FILM COATED ORAL NIGHTLY PRN
Qty: 90 TABLET | Refills: 1 | Status: SHIPPED | OUTPATIENT
Start: 2023-12-06

## 2023-12-06 RX ORDER — EZETIMIBE 10 MG/1
10 TABLET ORAL DAILY
Qty: 90 TABLET | Refills: 1 | Status: SHIPPED | OUTPATIENT
Start: 2023-12-06 | End: 2024-06-03

## 2023-12-06 RX ORDER — DICLOFENAC SODIUM 10 MG/G
2 GEL TOPICAL DAILY
Qty: 350 G | Refills: 1 | Status: SHIPPED | OUTPATIENT
Start: 2023-12-06

## 2023-12-06 RX ORDER — BENAZEPRIL HYDROCHLORIDE 20 MG/1
20 TABLET ORAL DAILY
Qty: 90 TABLET | Refills: 1 | Status: SHIPPED | OUTPATIENT
Start: 2023-12-06 | End: 2024-03-06

## 2023-12-06 RX ORDER — CITALOPRAM 20 MG/1
20 TABLET, FILM COATED ORAL DAILY
Qty: 90 TABLET | Refills: 1 | Status: SHIPPED | OUTPATIENT
Start: 2023-12-06

## 2023-12-06 RX ORDER — AMLODIPINE BESYLATE 5 MG/1
5 TABLET ORAL DAILY
Qty: 90 TABLET | Refills: 1 | Status: SHIPPED | OUTPATIENT
Start: 2023-12-06 | End: 2024-03-06 | Stop reason: DRUGHIGH

## 2023-12-06 RX ORDER — ATORVASTATIN CALCIUM 80 MG/1
80 TABLET, FILM COATED ORAL NIGHTLY
Qty: 90 TABLET | Refills: 1 | Status: SHIPPED | OUTPATIENT
Start: 2023-12-06

## 2023-12-06 NOTE — PROGRESS NOTES
[facility]  [facility]    DOS: 2023      Subjective:  Chief Complaint:    Chief Complaint   Patient presents with    Diabetes     2 month FU    Hypertension       History of Present Illness:  Gladys Dominguez is a 78 y.o. female with a PMH of HTN, HLD, depression, DM, insomnia, CAD (MI s/p PCTA 2020 on ASA), hx of PAF (after COVID, completed Eliquis).  Who presents today for: follow up    Acute Issues:   Complaining of mid lower back pain, non-radiating. Chronic, intermittently occurring since years ago. Never tried PT, declines today. Relieved somewhat with Tylenol, girdle, heat/ice. Never had imaging, per patient. Denies LE weakness, numbness, urinary/stool incontinence, trauma/injury to area.     Rectal bleeding- completely resolved. Does have hx of hemorrhoids, diverticulosis on colonoscopy.    Left leg swelling- resolved  S/p R leg femoral stent and follows vascular doctor Dr. Lau, saw him some time this year, states LE US was fine.     Reports getting sad mood around this time of the year with holidays.   3 years ago, mother  this year. Has strong family support. Denies sleep problems, AD, anhedonia, social isolation, SI/HI. On celexa qd for anxiety, and remeron prn for sleep. PHQ9 in office 2      2023    12:48 PM 2023    12:23 PM 2023    11:03 AM 10/10/2023     1:39 PM 2023    11:55 AM 3/15/2023     1:08 PM   Depression Patient Health Questionnaire   Over the last two weeks how often have you been bothered by little interest or pleasure in doing things Not at all Not at all Not at all Not at all Not at all Not at all   Over the last two weeks how often have you been bothered by feeling down, depressed or hopeless More than half the days Several days Several days Not at all Not at all Not at all   PHQ-2 Total Score 2 1 1 0 0 0   Over the last two weeks how often have you been bothered by trouble falling or staying  asleep, or sleeping too much Not at all        Over the last two weeks how often have you been bothered by feeling tired or having little energy Not at all        Over the last two weeks how often have you been bothered by a poor appetite or overeating Not at all        Over the last two weeks how often have you been bothered by feeling bad about yourself - or that you are a failure or have let yourself or your family down Not at all        Over the last two weeks how often have you been bothered by trouble concentrating on things, such as reading the newspaper or watching television Not at all        Over the last two weeks how often have you been bothered by moving or speaking so slowly that other people could have noticed. Or the opposite - being so fidgety or restless that you have been moving around a lot more than usual. Not at all        Over the last two weeks how often have you been bothered by thoughts that you would be better off dead, or of hurting yourself Not at all        If you checked off any problems, how difficult have these problems made it for you to do your work, take care of things at home or get along with other people? Not difficult at all        PHQ-9 Score 2        PHQ-9 Interpretation Minimal or None          Lives with grand-daughter and 5 of her children.  Does not drive anymore  Cooks what she wants for herself   3 years ago  Memory stable, no falls    Health Maintenance:  Lung CA: never smoker  Breast CA: last elodia ~2 yrs ago. Due for mammo  Colon CA: has not made an appt yet. Dr. Cifuentes. Last colonoscopies 5+ years ago per patient. States had 1 polyp. FH of colon CA in father (early age 60's), daughter (40's).   DEXA: states over 2+ years ago, denies any osteopenia. Denies FH of hip fractures. On calcium+vitd3 OTC.   Vaccines: Received Flu last visit. States UTD with Tetanus, Shingles. Received COVID 2021, 2021, 2022.   Not sure of PCV.     Social History     Socioeconomic  History    Marital status:     Number of children: 3   Occupational History    Occupation: retired   Tobacco Use    Smoking status: Never     Passive exposure: Never    Smokeless tobacco: Never   Substance and Sexual Activity    Alcohol use: Never    Drug use: Never    Sexual activity: Not Currently     Partners: Male     Social Determinants of Health     Financial Resource Strain: Low Risk  (12/6/2023)    Overall Financial Resource Strain (CARDIA)     Difficulty of Paying Living Expenses: Not hard at all   Food Insecurity: No Food Insecurity (12/6/2023)    Hunger Vital Sign     Worried About Running Out of Food in the Last Year: Never true     Ran Out of Food in the Last Year: Never true   Transportation Needs: No Transportation Needs (12/6/2023)    PRAPARE - Transportation     Lack of Transportation (Medical): No     Lack of Transportation (Non-Medical): No   Physical Activity: Inactive (12/6/2023)    Exercise Vital Sign     Days of Exercise per Week: 0 days     Minutes of Exercise per Session: 0 min   Stress: No Stress Concern Present (12/6/2023)    [employer] [place] [employer] - Occupational Stress Questionnaire     Feeling of Stress : Not at all   Social Connections: Moderately Isolated (12/6/2023)    Social Connection and Isolation Panel [NHANES]     Frequency of Communication with Friends and Family: More than three times a week     Frequency of Social Gatherings with Friends and Family: Once a week     Attends Voodoo Services: 1 to 4 times per year     Active Member of Clubs or Organizations: No     Attends Club or Organization Meetings: Never     Marital Status:    Housing Stability: Low Risk  (12/6/2023)    Housing Stability Vital Sign     Unable to Pay for Housing in the Last Year: No     Number of Places Lived in the Last Year: 1     Unstable Housing in the Last Year: No        Review of patient's allergies indicates:  No Known Allergies     Current Outpatient Medications:     " amLODIPine (NORVASC) 5 MG tablet, Take 1 tablet (5 mg total) by mouth once daily., Disp: 90 tablet, Rfl: 1    aspirin (ECOTRIN) 81 MG EC tablet, Take 81 mg by mouth once daily., Disp: , Rfl:     atorvastatin (LIPITOR) 80 MG tablet, Take 1 tablet (80 mg total) by mouth every evening., Disp: 90 tablet, Rfl: 1    benazepriL (LOTENSIN) 20 MG tablet, Take 1 tablet (20 mg total) by mouth once daily., Disp: 90 tablet, Rfl: 1    citalopram (CELEXA) 20 MG tablet, Take 1 tablet (20 mg total) by mouth once daily., Disp: 90 tablet, Rfl: 1    diclofenac sodium (VOLTAREN) 1 % Gel, Apply 2 g topically once daily., Disp: 350 g, Rfl: 1    ezetimibe (ZETIA) 10 mg tablet, Take 1 tablet (10 mg total) by mouth once daily., Disp: 90 tablet, Rfl: 1    mirtazapine (REMERON) 15 MG tablet, Take 1 tablet (15 mg total) by mouth nightly as needed., Disp: 90 tablet, Rfl: 1     Review of Systems:  Review of Systems   Constitutional:  Negative for chills, fever, malaise/fatigue and weight loss.   Respiratory:  Negative for shortness of breath.    Cardiovascular:  Negative for chest pain, palpitations and leg swelling.   Gastrointestinal:  Negative for abdominal pain, constipation, diarrhea, nausea and vomiting.   Musculoskeletal:  Positive for back pain. Negative for falls.   Neurological:  Negative for tingling, weakness and headaches.   Psychiatric/Behavioral:  Negative for suicidal ideas. The patient is not nervous/anxious.      Objective:   Vitals:    12/06/23 1106   BP: 135/77   BP Location: Left arm   Patient Position: Sitting   BP Method: Large (Automatic)   Pulse: 74   Resp: 20   Temp: 98.6 °F (37 °C)   TempSrc: Oral   SpO2: 99%   Weight: 84 kg (185 lb 3 oz)   Height: 5' 5" (1.651 m)     General: appears well, in no acute distress   HENT: MMM, oropharynx without erythema/exudate   Neck: supple, no lymphadenopathy, no carotid bruits   Respiratory: clear to auscultation bilaterally, nonlabored respirations   Cardiovascular: regular rate and " rhythm without murmurs or gallops, no edema in bilateral lower extremities   Gastrointestinal: soft, non-tender, non-distended, bowel sounds present   Genitourinary: no suprapubic tenderness   Musculoskeletal: no gross deformities observed. No TTP over back. Straight leg test negative. Back full aROM  Integumentary: warm, dry  Neuro: No focal lesions observed       Recent labs:  CBC:  Lab Results   Component Value Date    WBC 5.19 12/06/2023    RBC 3.93 (L) 12/06/2023    HGB 10.3 (L) 12/06/2023    HCT 32.1 (L) 12/06/2023    MCV 81.7 12/06/2023    MCH 26.2 (L) 12/06/2023    MCHC 32.1 (L) 12/06/2023    RDW 16.5 12/06/2023     12/06/2023    MPV 10.1 12/06/2023      CMP:  Sodium   Date Value Ref Range Status   08/08/2020 137 136 - 145 mmol/L Final     Sodium Level   Date Value Ref Range Status   12/06/2023 141 136 - 145 mmol/L Final     Potassium   Date Value Ref Range Status   08/08/2020 4.4 3.5 - 5.1 mmol/L Final     Potassium Level   Date Value Ref Range Status   12/06/2023 3.9 3.5 - 5.1 mmol/L Final     Chloride   Date Value Ref Range Status   08/08/2020 104 100 - 109 mmol/L Final     Carbon Dioxide   Date Value Ref Range Status   12/06/2023 28 23 - 31 mmol/L Final   08/08/2020 24 22 - 33 mmol/L Final     Blood Urea Nitrogen   Date Value Ref Range Status   12/06/2023 17.0 9.8 - 20.1 mg/dL Final   08/08/2020 22 5 - 25 mg/dL Final     Creatinine   Date Value Ref Range Status   12/06/2023 0.91 0.55 - 1.02 mg/dL Final   08/08/2020 0.70 0.57 - 1.25 mg/dL Final     Calcium   Date Value Ref Range Status   08/08/2020 8.6 (L) 8.8 - 10.6 mg/dL Final     Calcium Level Total   Date Value Ref Range Status   12/06/2023 9.4 8.4 - 10.2 mg/dL Final     Albumin Level   Date Value Ref Range Status   12/06/2023 3.8 3.4 - 4.8 g/dL Final     Bilirubin Total   Date Value Ref Range Status   12/06/2023 0.6 <=1.5 mg/dL Final     Alkaline Phosphatase   Date Value Ref Range Status   12/06/2023 49 40 - 150 unit/L Final     Aspartate  "Aminotransferase   Date Value Ref Range Status   12/06/2023 15 5 - 34 unit/L Final     Alanine Aminotransferase   Date Value Ref Range Status   12/06/2023 8 0 - 55 unit/L Final     Anion Gap   Date Value Ref Range Status   08/08/2020 9 8 - 16 mmol/L Final     eGFR    Date Value Ref Range Status   08/08/2020 99 >=60 mL/min/1.73mSq Final      BMP:  Lab Results   Component Value Date     12/06/2023    K 3.9 12/06/2023     08/08/2020    CO2 28 12/06/2023    BUN 17.0 12/06/2023    CREATININE 0.91 12/06/2023    CALCIUM 9.4 12/06/2023    ANIONGAP 9 08/08/2020    ESTGFRAFRICA 99 08/08/2020      Lipid Panel:  Lab Results   Component Value Date    CHOL 234 (H) 12/06/2023    CHOL 275 (H) 06/19/2023    CHOL 377 (H) 03/15/2023     Lab Results   Component Value Date    HDL 42 12/06/2023    HDL 41 06/19/2023    HDL 51 03/15/2023     No results found for: "LDLCALC"  Lab Results   Component Value Date    TRIG 151 (H) 12/06/2023    TRIG 199 (H) 06/19/2023    TRIG 232 (H) 03/15/2023     No results found for: "CHOLHDL"   HbA1c:  Lab Results   Component Value Date    HGBA1C 5.9 12/06/2023      TSH:  Lab Results   Component Value Date    TSH 1.854 03/15/2023     Assessment & Plan:    Gladys Dominguez is presenting as above and will be treated as follows:    1. High cholesterol  - atorvastatin (LIPITOR) 80 MG tablet; Take 1 tablet (80 mg total) by mouth every evening.  Dispense: 90 tablet; Refill: 1  - Lipid Panel; Future  - ezetimibe (ZETIA) 10 mg tablet; Take 1 tablet (10 mg total) by mouth once daily.  Dispense: 90 tablet; Refill: 1  - Lipid Panel  - start Zetia.   - advised pt to speak with Dr. Watkins about PCSK9 inhibitors. Pt agrees with plan  - continue High intensity statin    2. Essential hypertension  - amLODIPine (NORVASC) 5 MG tablet; Take 1 tablet (5 mg total) by mouth once daily.  Dispense: 90 tablet; Refill: 1  - benazepriL (LOTENSIN) 20 MG tablet; Take 1 tablet (20 mg total) by mouth once daily.  " Dispense: 90 tablet; Refill: 1  - Comprehensive Metabolic Panel; Future  - Comprehensive Metabolic Panel  - well controlled, continue htn regimen    3. Anxiety  - citalopram (CELEXA) 20 MG tablet; Take 1 tablet (20 mg total) by mouth once daily.  Dispense: 90 tablet; Refill: 1  - CTM for depression, anxiety. PHQ9 done today: 2    4. Type 2 diabetes mellitus with diabetic peripheral angiopathy without gangrene, without long-term current use of insulin  - Hemoglobin A1C; Future  - Hemoglobin A1C  - will do A1c after pt has been off of metformin for at least 3 months. Next visit.     5. Chronic midline low back pain without sciatica  - diclofenac sodium (VOLTAREN) 1 % Gel; Apply 2 g topically once daily.  Dispense: 350 g; Refill: 1  - declined PT today  - provided education packet for back exercises at home  - continue with conservative measures. No red flags symptoms.     6. Insomnia, unspecified type  - mirtazapine (REMERON) 15 MG tablet; Take 1 tablet (15 mg total) by mouth nightly as needed.  Dispense: 90 tablet; Refill: 1    7. Anemia, unspecified type  - CBC Auto Differential; Future  - CBC Auto Differential    8. Breast cancer screening by mammogram  - Mammo Digital Screening Bilat w/ Ayaz; Future    Working on obtaining records from pt's former PCP (Dr. Shubham Pineda), and Dr. Sid STEVENSON US results, among others.   Advised to get COVID shot, pt agrees to do so.   Advised to schedule for colonoscopy as soon as possible, pt agrees to do so.     RTC in 3 months for HLD follow up, or sooner if needed.     Cookie Carney MD  [facility] HO-2

## 2023-12-06 NOTE — PATIENT INSTRUCTIONS
Please ask your Cardiologist Dr. Lau about your high LDL, about possibly initiating PCSK9 inhibitor. Starting Zetia today.   Please schedule your colonoscopy ASAP  Get your COVID vaccine

## 2023-12-26 ENCOUNTER — HOSPITAL ENCOUNTER (OUTPATIENT)
Dept: RADIOLOGY | Facility: HOSPITAL | Age: 78
Discharge: HOME | End: 2023-12-26
Payer: MEDICARE

## 2023-12-26 DIAGNOSIS — Z12.31 BREAST CANCER SCREENING BY MAMMOGRAM: ICD-10-CM

## 2023-12-26 PROCEDURE — 77067 SCR MAMMO BI INCL CAD: CPT | Mod: TC

## 2023-12-26 PROCEDURE — 77067 SCR MAMMO BI INCL CAD: CPT | Mod: 26,,, | Performed by: RADIOLOGY

## 2023-12-26 PROCEDURE — 77067 MAMMO DIGITAL SCREENING BILAT WITH TOMO: ICD-10-PCS | Mod: 26,,, | Performed by: RADIOLOGY

## 2023-12-26 PROCEDURE — 77063 MAMMO DIGITAL SCREENING BILAT WITH TOMO: ICD-10-PCS | Mod: 26,,, | Performed by: RADIOLOGY

## 2023-12-26 PROCEDURE — 77063 BREAST TOMOSYNTHESIS BI: CPT | Mod: 26,,, | Performed by: RADIOLOGY

## 2024-01-08 NOTE — PROGRESS NOTES
Date of Service: 12/6/2023    Attending Attestation: Patient discussed with resident. The chart was reviewed thoroughly including pertinent vitals, labs, imaging, medications, prior notes, and consultant/specialist recommendations.  I participated in the management of the patient, examined the patient, reviewed the summary of the plan, and was immediately available at all times throughout the encounter. Services were furnished in a primary care center located in the outpatient department of a [facility] hospital. I agree with the resident's findings and plan as documented in the resident's note.    Nany Hansen MD  Attending - Family Medicine / Geriatric Medicine  [facility] [place], [facility]

## 2024-03-06 ENCOUNTER — OFFICE VISIT (OUTPATIENT)
Dept: FAMILY MEDICINE | Facility: CLINIC | Age: 79
End: 2024-03-06
Payer: MEDICARE

## 2024-03-06 VITALS
BODY MASS INDEX: 30.52 KG/M2 | RESPIRATION RATE: 18 BRPM | DIASTOLIC BLOOD PRESSURE: 76 MMHG | TEMPERATURE: 99 F | WEIGHT: 183.19 LBS | HEIGHT: 65 IN | SYSTOLIC BLOOD PRESSURE: 147 MMHG | OXYGEN SATURATION: 100 % | HEART RATE: 72 BPM

## 2024-03-06 DIAGNOSIS — I10 ESSENTIAL HYPERTENSION: Primary | ICD-10-CM

## 2024-03-06 DIAGNOSIS — E78.2 MIXED HYPERLIPIDEMIA: ICD-10-CM

## 2024-03-06 DIAGNOSIS — E11.9 TYPE 2 DIABETES MELLITUS WITHOUT COMPLICATION, WITHOUT LONG-TERM CURRENT USE OF INSULIN: ICD-10-CM

## 2024-03-06 LAB
CHOLEST SERPL-MCNC: 153 MG/DL
CHOLEST/HDLC SERPL: 3 {RATIO} (ref 0–5)
EST. AVERAGE GLUCOSE BLD GHB EST-MCNC: 108.3 MG/DL
HBA1C MFR BLD: 5.4 %
HDLC SERPL-MCNC: 50 MG/DL (ref 35–60)
LDLC SERPL CALC-MCNC: 80 MG/DL (ref 50–140)
TRIGL SERPL-MCNC: 113 MG/DL (ref 37–140)
VLDLC SERPL CALC-MCNC: 23 MG/DL

## 2024-03-06 PROCEDURE — 99213 OFFICE O/P EST LOW 20 MIN: CPT | Mod: PBBFAC | Performed by: FAMILY MEDICINE

## 2024-03-06 PROCEDURE — 83036 HEMOGLOBIN GLYCOSYLATED A1C: CPT | Performed by: FAMILY MEDICINE

## 2024-03-06 PROCEDURE — 36415 COLL VENOUS BLD VENIPUNCTURE: CPT | Performed by: FAMILY MEDICINE

## 2024-03-06 PROCEDURE — 80061 LIPID PANEL: CPT | Performed by: FAMILY MEDICINE

## 2024-03-06 RX ORDER — BENAZEPRIL HYDROCHLORIDE 40 MG/1
40 TABLET ORAL DAILY
Qty: 90 TABLET | Refills: 1 | Status: SHIPPED | OUTPATIENT
Start: 2024-03-06 | End: 2024-06-17 | Stop reason: SDUPTHER

## 2024-03-06 NOTE — PATIENT INSTRUCTIONS
"For your Blood pressure -   STOP AMLODIPINE    Please take 2 pills together in the morning for the   "Benazepril 20mg" until the bottle is over.   Once it is done, I have sent a new prescription to your pharmacy for Benazepril 40mg tablet, you take ONE pill in the morning when you start the new increased dose.     Please take your BP med in the morning with your breakfast.  Please check your blood pressure with your home cuff approximately 3-4 hours after the morning meds (approx 11am-12pm) but before eating lunch.  Write it down in the log daily, along with the HR/Pulse.    If you notice the top number of the blood pressure being higher than 140s consistently, or less than 100 consistently, or if you feel headaches, dizziness, lightheaded, or chest pain, contact the clinic immediately.   If your HR is higher than 100 or less than 55 consistently and/or you feel palpitations, or like you might faint, again please let us know immediately.  If it is after hours or weekend, go to urgent care or ER.   Call clinic at [phone] and ask for your doctors' nurse for any assistance.     "

## 2024-05-20 NOTE — PROGRESS NOTES
[facility]  [facility]    DOS: 3/6/2024      Subjective:  Chief Complaint:    Chief Complaint   Patient presents with    Follow-up    Hypertension     **  History of Present Illness:  Gladys Dominguez is a 79 y.o. female with a PMH of HTN, HLD, depression, DM, insomnia, CAD (MI s/p PCTA 2020 on ASA), hx of PAF (after COVID, completed Eliquis).  Who presents today for: follow up    Acute Issues:   Complaining of mid lower back pain, non-radiating. Chronic, intermittently occurring since years ago. Never tried PT, declines today. Relieved somewhat with Tylenol, girdle, heat/ice. Never had imaging, per patient. Denies LE weakness, numbness, urinary/stool incontinence, trauma/injury to area.     Rectal bleeding- completely resolved. Does have hx of hemorrhoids, diverticulosis on colonoscopy.    Left leg swelling- resolved  S/p R leg femoral stent and follows vascular doctor Dr. Lau, saw him some time this year, states LE US was fine.     Reports getting sad mood around this time of the year with holidays.   3 years ago, mother  this year. Has strong family support. Denies sleep problems, AD, anhedonia, social isolation, SI/HI. On celexa qd for anxiety, and remeron prn for sleep. PHQ9 in office 2      3/6/2024    12:04 PM 2023    12:48 PM 2023    12:23 PM 2023    11:03 AM 10/10/2023     1:39 PM 2023    11:55 AM 3/15/2023     1:08 PM   Depression Patient Health Questionnaire   Over the last two weeks how often have you been bothered by little interest or pleasure in doing things Not at all Not at all Not at all Not at all Not at all Not at all Not at all   Over the last two weeks how often have you been bothered by feeling down, depressed or hopeless Not at all More than half the days Several days Several days Not at all Not at all Not at all   PHQ-2 Total Score 0 2 1 1 0 0 0   Over the last two weeks how often have you been bothered  by trouble falling or staying asleep, or sleeping too much  Not at all        Over the last two weeks how often have you been bothered by feeling tired or having little energy  Not at all        Over the last two weeks how often have you been bothered by a poor appetite or overeating  Not at all        Over the last two weeks how often have you been bothered by feeling bad about yourself - or that you are a failure or have let yourself or your family down  Not at all        Over the last two weeks how often have you been bothered by trouble concentrating on things, such as reading the newspaper or watching television  Not at all        Over the last two weeks how often have you been bothered by moving or speaking so slowly that other people could have noticed. Or the opposite - being so fidgety or restless that you have been moving around a lot more than usual.  Not at all        Over the last two weeks how often have you been bothered by thoughts that you would be better off dead, or of hurting yourself  Not at all        If you checked off any problems, how difficult have these problems made it for you to do your work, take care of things at home or get along with other people?  Not difficult at all        PHQ-9 Score  2        PHQ-9 Interpretation  Minimal or None          Lives with grand-daughter and 5 of her children.  Does not drive anymore  Cooks what she wants for herself   3 years ago  Memory stable, no falls    Health Maintenance:  Lung CA: never smoker  Breast CA: last elodia ~2 yrs ago. Due for mammo  Colon CA: has not made an appt yet. Dr. Cifuentes. Last colonoscopies 5+ years ago per patient. States had 1 polyp. FH of colon CA in father (early age 60's), daughter (40's).   DEXA: states over 2+ years ago, denies any osteopenia. Denies FH of hip fractures. On calcium+vitd3 OTC.   Vaccines: Received Flu last visit. States UTD with Tetanus, Shingles. Received COVID 2021, 2021, 2022.   Not sure of PCV.      Social History     Socioeconomic History    Marital status:     Number of children: 3   Occupational History    Occupation: retired   Tobacco Use    Smoking status: Never     Passive exposure: Never    Smokeless tobacco: Never   Substance and Sexual Activity    Alcohol use: Never    Drug use: Never    Sexual activity: Not Currently     Partners: Male     Social Determinants of Health     Financial Resource Strain: Low Risk  (12/6/2023)    Overall Financial Resource Strain (CARDIA)     Difficulty of Paying Living Expenses: Not hard at all   Food Insecurity: No Food Insecurity (12/6/2023)    Hunger Vital Sign     Worried About Running Out of Food in the Last Year: Never true     Ran Out of Food in the Last Year: Never true   Transportation Needs: No Transportation Needs (3/6/2024)    PRAPARE - Transportation     Lack of Transportation (Medical): No     Lack of Transportation (Non-Medical): No   Physical Activity: Inactive (12/6/2023)    Exercise Vital Sign     Days of Exercise per Week: 0 days     Minutes of Exercise per Session: 0 min   Stress: No Stress Concern Present (12/6/2023)    [employer] - Occupational Stress Questionnaire     Feeling of Stress : Not at all   Housing Stability: Low Risk  (12/6/2023)    Housing Stability Vital Sign     Unable to Pay for Housing in the Last Year: No     Number of Places Lived in the Last Year: 1     Unstable Housing in the Last Year: No        Review of patient's allergies indicates:  No Known Allergies     Current Outpatient Medications:     aspirin (ECOTRIN) 81 MG EC tablet, Take 81 mg by mouth once daily., Disp: , Rfl:     atorvastatin (LIPITOR) 80 MG tablet, Take 1 tablet (80 mg total) by mouth every evening., Disp: 90 tablet, Rfl: 1    benazepriL (LOTENSIN) 40 MG tablet, Take 1 tablet (40 mg total) by mouth once daily., Disp: 90 tablet, Rfl: 1    citalopram (CELEXA) 20 MG tablet, Take 1 tablet (20 mg total) by mouth once daily.,  "Disp: 90 tablet, Rfl: 1    diclofenac sodium (VOLTAREN) 1 % Gel, Apply 2 g topically once daily., Disp: 350 g, Rfl: 1    ezetimibe (ZETIA) 10 mg tablet, Take 1 tablet (10 mg total) by mouth once daily., Disp: 90 tablet, Rfl: 1    mirtazapine (REMERON) 15 MG tablet, Take 1 tablet (15 mg total) by mouth nightly as needed., Disp: 90 tablet, Rfl: 1     Review of Systems:  Review of Systems   Constitutional:  Negative for chills, fever, malaise/fatigue and weight loss.   Respiratory:  Negative for shortness of breath.    Cardiovascular:  Negative for chest pain, palpitations and leg swelling.   Gastrointestinal:  Negative for abdominal pain, constipation, diarrhea, nausea and vomiting.   Musculoskeletal:  Positive for back pain. Negative for falls.   Neurological:  Negative for tingling, weakness and headaches.   Psychiatric/Behavioral:  Negative for suicidal ideas. The patient is not nervous/anxious.      Objective:   Vitals:    03/06/24 1205   BP: (!) 147/76   BP Location: Left arm   Patient Position: Sitting   BP Method: Large (Automatic)   Pulse: 72   Resp: 18   Temp: 98.6 °F (37 °C)   TempSrc: Oral   SpO2: 100%   Weight: 83.1 kg (183 lb 3.2 oz)   Height: 5' 5" (1.651 m)     General: appears well, in no acute distress   HENT: MMM, oropharynx without erythema/exudate   Neck: supple, no lymphadenopathy, no carotid bruits   Respiratory: clear to auscultation bilaterally, nonlabored respirations   Cardiovascular: regular rate and rhythm without murmurs or gallops, no edema in bilateral lower extremities   Gastrointestinal: soft, non-tender, non-distended, bowel sounds present   Genitourinary: no suprapubic tenderness   Musculoskeletal: no gross deformities observed. No TTP over back. Straight leg test negative. Back full aROM  Integumentary: warm, dry  Neuro: No focal lesions observed       Recent labs:  CBC:  Lab Results   Component Value Date    WBC 5.19 12/06/2023    RBC 3.93 (L) 12/06/2023    HGB 10.3 (L) 12/06/2023 "    HCT 32.1 (L) 12/06/2023    MCV 81.7 12/06/2023    MCH 26.2 (L) 12/06/2023    MCHC 32.1 (L) 12/06/2023    RDW 16.5 12/06/2023     12/06/2023    MPV 10.1 12/06/2023      CMP:  Sodium   Date Value Ref Range Status   12/06/2023 141 136 - 145 mmol/L Final   08/08/2020 137 136 - 145 mmol/L Final     Potassium   Date Value Ref Range Status   12/06/2023 3.9 3.5 - 5.1 mmol/L Final   08/08/2020 4.4 3.5 - 5.1 mmol/L Final     Chloride   Date Value Ref Range Status   08/08/2020 104 100 - 109 mmol/L Final     CO2   Date Value Ref Range Status   12/06/2023 28 23 - 31 mmol/L Final     Carbon Dioxide   Date Value Ref Range Status   08/08/2020 24 22 - 33 mmol/L Final     Blood Urea Nitrogen   Date Value Ref Range Status   12/06/2023 17.0 9.8 - 20.1 mg/dL Final   08/08/2020 22 5 - 25 mg/dL Final     Creatinine   Date Value Ref Range Status   12/06/2023 0.91 0.55 - 1.02 mg/dL Final   08/08/2020 0.70 0.57 - 1.25 mg/dL Final     Calcium   Date Value Ref Range Status   12/06/2023 9.4 8.4 - 10.2 mg/dL Final   08/08/2020 8.6 (L) 8.8 - 10.6 mg/dL Final     Albumin   Date Value Ref Range Status   12/06/2023 3.8 3.4 - 4.8 g/dL Final     Bilirubin Total   Date Value Ref Range Status   12/06/2023 0.6 <=1.5 mg/dL Final     ALP   Date Value Ref Range Status   12/06/2023 49 40 - 150 unit/L Final     AST   Date Value Ref Range Status   12/06/2023 15 5 - 34 unit/L Final     ALT   Date Value Ref Range Status   12/06/2023 8 0 - 55 unit/L Final     Anion Gap   Date Value Ref Range Status   08/08/2020 9 8 - 16 mmol/L Final     eGFR    Date Value Ref Range Status   08/08/2020 99 >=60 mL/min/1.73mSq Final      BMP:  Lab Results   Component Value Date     12/06/2023    K 3.9 12/06/2023     08/08/2020    CO2 28 12/06/2023    BUN 17.0 12/06/2023    CREATININE 0.91 12/06/2023    CALCIUM 9.4 12/06/2023    ANIONGAP 9 08/08/2020    ESTGFRAFRICA 99 08/08/2020      Lipid Panel:  Lab Results   Component Value Date    CHOL 153  "03/06/2024    CHOL 234 (H) 12/06/2023    CHOL 275 (H) 06/19/2023     Lab Results   Component Value Date    HDL 50 03/06/2024    HDL 42 12/06/2023    HDL 41 06/19/2023     No results found for: "LDLCALC"  Lab Results   Component Value Date    TRIG 113 03/06/2024    TRIG 151 (H) 12/06/2023    TRIG 199 (H) 06/19/2023     No results found for: "CHOLHDL"   HbA1c:  Lab Results   Component Value Date    HGBA1C 5.4 03/06/2024      TSH:  Lab Results   Component Value Date    TSH 1.854 03/15/2023     Assessment & Plan:    Gladys Dominguez is presenting as above and will be treated as follows:  Gladys was seen today for follow-up and hypertension.    Diagnoses and all orders for this visit:    Essential hypertension  -     benazepriL (LOTENSIN) 40 MG tablet; Take 1 tablet (40 mg total) by mouth once daily.    Type 2 diabetes mellitus without complication, without long-term current use of insulin  -     Hemoglobin A1C; Future  -     Hemoglobin A1C    Mixed hyperlipidemia  -     Lipid Panel; Future  -     Lipid Panel        1. High cholesterol  - atorvastatin (LIPITOR) 80 MG tablet; Take 1 tablet (80 mg total) by mouth every evening.  Dispense: 90 tablet; Refill: 1  - Lipid Panel; Future  - ezetimibe (ZETIA) 10 mg tablet; Take 1 tablet (10 mg total) by mouth once daily.  Dispense: 90 tablet; Refill: 1  - Lipid Panel  - start Zetia.   - advised pt to speak with Dr. Watkins about PCSK9 inhibitors. Pt agrees with plan  - continue High intensity statin    2. Essential hypertension  - amLODIPine (NORVASC) 5 MG tablet; Take 1 tablet (5 mg total) by mouth once daily.  Dispense: 90 tablet; Refill: 1  - benazepriL (LOTENSIN) 20 MG tablet; Take 1 tablet (20 mg total) by mouth once daily.  Dispense: 90 tablet; Refill: 1  - Comprehensive Metabolic Panel; Future  - Comprehensive Metabolic Panel  - well controlled, continue htn regimen    3. Anxiety  - citalopram (CELEXA) 20 MG tablet; Take 1 tablet (20 mg total) by mouth once daily.  Dispense: 90 " tablet; Refill: 1  - CTM for depression, anxiety. PHQ9 done today: 2    4. Type 2 diabetes mellitus with diabetic peripheral angiopathy without gangrene, without long-term current use of insulin  - Hemoglobin A1C; Future  - Hemoglobin A1C  - will do A1c after pt has been off of metformin for at least 3 months. Next visit.     5. Chronic midline low back pain without sciatica  - diclofenac sodium (VOLTAREN) 1 % Gel; Apply 2 g topically once daily.  Dispense: 350 g; Refill: 1  - declined PT today  - provided education packet for back exercises at home  - continue with conservative measures. No red flags symptoms.     6. Insomnia, unspecified type  - mirtazapine (REMERON) 15 MG tablet; Take 1 tablet (15 mg total) by mouth nightly as needed.  Dispense: 90 tablet; Refill: 1    7. Anemia, unspecified type  - CBC Auto Differential; Future  - CBC Auto Differential    8. Breast cancer screening by mammogram  - Mammo Digital Screening Bilat w/ Ayaz; Future    Working on obtaining records from pt's former PCP (Dr. Shubham Pineda), and Dr. iSd STEVENSON US results, among others.   Advised to get COVID shot, pt agrees to do so.   Advised to schedule for colonoscopy as soon as possible, pt agrees to do so.     RTC in 3 months for HLD follow up, or sooner if needed.     Nany Hansen MD  Attending - Family Medicine / Geriatric Medicine  [facility] [place], [facility]

## 2024-06-17 ENCOUNTER — OFFICE VISIT (OUTPATIENT)
Dept: FAMILY MEDICINE | Facility: CLINIC | Age: 79
End: 2024-06-17
Payer: MEDICARE

## 2024-06-17 VITALS
HEART RATE: 63 BPM | BODY MASS INDEX: 30.37 KG/M2 | SYSTOLIC BLOOD PRESSURE: 144 MMHG | WEIGHT: 182.31 LBS | OXYGEN SATURATION: 100 % | DIASTOLIC BLOOD PRESSURE: 79 MMHG | HEIGHT: 65 IN | TEMPERATURE: 98 F | RESPIRATION RATE: 20 BRPM

## 2024-06-17 DIAGNOSIS — I10 ESSENTIAL HYPERTENSION: Primary | ICD-10-CM

## 2024-06-17 DIAGNOSIS — M54.50 CHRONIC MIDLINE LOW BACK PAIN WITHOUT SCIATICA: ICD-10-CM

## 2024-06-17 DIAGNOSIS — E78.00 HIGH CHOLESTEROL: ICD-10-CM

## 2024-06-17 DIAGNOSIS — G47.00 INSOMNIA, UNSPECIFIED TYPE: ICD-10-CM

## 2024-06-17 DIAGNOSIS — E11.9 TYPE 2 DIABETES MELLITUS WITHOUT COMPLICATION, WITHOUT LONG-TERM CURRENT USE OF INSULIN: ICD-10-CM

## 2024-06-17 DIAGNOSIS — G89.29 CHRONIC MIDLINE LOW BACK PAIN WITHOUT SCIATICA: ICD-10-CM

## 2024-06-17 DIAGNOSIS — I25.10 ARTERIOSCLEROSIS OF CORONARY ARTERY: ICD-10-CM

## 2024-06-17 DIAGNOSIS — Z23 IMMUNIZATION DUE: ICD-10-CM

## 2024-06-17 LAB
ALBUMIN SERPL-MCNC: 3.9 G/DL (ref 3.4–4.8)
ALBUMIN/GLOB SERPL: 1.1 RATIO (ref 1.1–2)
ALP SERPL-CCNC: 57 UNIT/L (ref 40–150)
ALT SERPL-CCNC: 16 UNIT/L (ref 0–55)
ANION GAP SERPL CALC-SCNC: 7 MEQ/L
AST SERPL-CCNC: 17 UNIT/L (ref 5–34)
BASOPHILS # BLD AUTO: 0.03 X10(3)/MCL
BASOPHILS NFR BLD AUTO: 0.6 %
BILIRUB SERPL-MCNC: 0.5 MG/DL
BUN SERPL-MCNC: 18.2 MG/DL (ref 9.8–20.1)
CALCIUM SERPL-MCNC: 9.8 MG/DL (ref 8.4–10.2)
CHLORIDE SERPL-SCNC: 106 MMOL/L (ref 98–107)
CHOLEST SERPL-MCNC: 143 MG/DL
CHOLEST/HDLC SERPL: 3 {RATIO} (ref 0–5)
CO2 SERPL-SCNC: 27 MMOL/L (ref 23–31)
CREAT SERPL-MCNC: 1.11 MG/DL (ref 0.55–1.02)
CREAT/UREA NIT SERPL: 16
EOSINOPHIL # BLD AUTO: 0.07 X10(3)/MCL (ref 0–0.9)
EOSINOPHIL NFR BLD AUTO: 1.4 %
ERYTHROCYTE [DISTWIDTH] IN BLOOD BY AUTOMATED COUNT: 16.9 % (ref 11.5–17)
EST. AVERAGE GLUCOSE BLD GHB EST-MCNC: 114 MG/DL
GFR SERPLBLD CREATININE-BSD FMLA CKD-EPI: 50 ML/MIN/1.73/M2
GLOBULIN SER-MCNC: 3.4 GM/DL (ref 2.4–3.5)
GLUCOSE SERPL-MCNC: 82 MG/DL (ref 82–115)
HBA1C MFR BLD: 5.6 %
HCT VFR BLD AUTO: 28.8 % (ref 37–47)
HDLC SERPL-MCNC: 46 MG/DL (ref 35–60)
HGB BLD-MCNC: 9.5 G/DL (ref 12–16)
IMM GRANULOCYTES # BLD AUTO: 0.01 X10(3)/MCL (ref 0–0.04)
IMM GRANULOCYTES NFR BLD AUTO: 0.2 %
LDLC SERPL CALC-MCNC: 77 MG/DL (ref 50–140)
LYMPHOCYTES # BLD AUTO: 1.06 X10(3)/MCL (ref 0.6–4.6)
LYMPHOCYTES NFR BLD AUTO: 20.5 %
MCH RBC QN AUTO: 27.4 PG (ref 27–31)
MCHC RBC AUTO-ENTMCNC: 33 G/DL (ref 33–36)
MCV RBC AUTO: 83 FL (ref 80–94)
MONOCYTES # BLD AUTO: 0.41 X10(3)/MCL (ref 0.1–1.3)
MONOCYTES NFR BLD AUTO: 7.9 %
NEUTROPHILS # BLD AUTO: 3.6 X10(3)/MCL (ref 2.1–9.2)
NEUTROPHILS NFR BLD AUTO: 69.4 %
NRBC BLD AUTO-RTO: 0 %
PLATELET # BLD AUTO: 335 X10(3)/MCL (ref 130–400)
PMV BLD AUTO: 10.2 FL (ref 7.4–10.4)
POTASSIUM SERPL-SCNC: 4.1 MMOL/L (ref 3.5–5.1)
PROT SERPL-MCNC: 7.3 GM/DL (ref 5.8–7.6)
RBC # BLD AUTO: 3.47 X10(6)/MCL (ref 4.2–5.4)
SODIUM SERPL-SCNC: 140 MMOL/L (ref 136–145)
TRIGL SERPL-MCNC: 101 MG/DL (ref 37–140)
VLDLC SERPL CALC-MCNC: 20 MG/DL
WBC # BLD AUTO: 5.18 X10(3)/MCL (ref 4.5–11.5)

## 2024-06-17 PROCEDURE — 80061 LIPID PANEL: CPT | Performed by: FAMILY MEDICINE

## 2024-06-17 PROCEDURE — 80053 COMPREHEN METABOLIC PANEL: CPT | Performed by: FAMILY MEDICINE

## 2024-06-17 PROCEDURE — 85025 COMPLETE CBC W/AUTO DIFF WBC: CPT | Performed by: FAMILY MEDICINE

## 2024-06-17 PROCEDURE — 83036 HEMOGLOBIN GLYCOSYLATED A1C: CPT | Performed by: FAMILY MEDICINE

## 2024-06-17 PROCEDURE — 36415 COLL VENOUS BLD VENIPUNCTURE: CPT | Performed by: FAMILY MEDICINE

## 2024-06-17 PROCEDURE — 99213 OFFICE O/P EST LOW 20 MIN: CPT | Mod: PBBFAC | Performed by: FAMILY MEDICINE

## 2024-06-17 RX ORDER — MIRTAZAPINE 15 MG/1
15 TABLET, FILM COATED ORAL NIGHTLY PRN
Qty: 90 TABLET | Refills: 1 | Status: SHIPPED | OUTPATIENT
Start: 2024-06-17

## 2024-06-17 RX ORDER — EZETIMIBE 10 MG/1
10 TABLET ORAL DAILY
Qty: 90 TABLET | Refills: 1 | Status: SHIPPED | OUTPATIENT
Start: 2024-06-17 | End: 2024-12-14

## 2024-06-17 RX ORDER — NITROGLYCERIN 0.4 MG/1
0.4 TABLET SUBLINGUAL EVERY 5 MIN PRN
COMMUNITY
Start: 2024-05-01

## 2024-06-17 RX ORDER — ATORVASTATIN CALCIUM 80 MG/1
80 TABLET, FILM COATED ORAL NIGHTLY
Qty: 90 TABLET | Refills: 1 | Status: SHIPPED | OUTPATIENT
Start: 2024-06-17

## 2024-06-17 RX ORDER — BENAZEPRIL HYDROCHLORIDE 40 MG/1
40 TABLET ORAL DAILY
Qty: 90 TABLET | Refills: 1 | Status: SHIPPED | OUTPATIENT
Start: 2024-06-17

## 2024-06-17 RX ORDER — DICLOFENAC SODIUM 10 MG/G
2 GEL TOPICAL DAILY
Qty: 350 G | Refills: 1 | Status: SHIPPED | OUTPATIENT
Start: 2024-06-17

## 2024-06-17 NOTE — PROGRESS NOTES
[facility]  [facility] Note    DOS: 6/17/2024      Subjective:  Chief Complaint:    Chief Complaint   Patient presents with    Follow-up     C/o sciatic pain and left ear pain.       History of Present Illness:  Gladys Dominguez is a 79 y.o. female with a PMH of HTN, HLD, depression, DM, insomnia, CAD (MI s/p PCTA 8/2020 on ASA), hx of PAF (after COVID, completed Eliquis).    Who presents today for: follow up    Acute complaints:  Having some mild r. Hip erinn, chronic, worse w/ movement   Relieved w/ Tylenol once daily   No sciatic symptoms or back pain     Left sided ear pain which is almost resolved   No TMJ symptoms   No fever or discharge   No hearing deficit   No sinus issues     Feeling good otherwise     Health Maintenance:  Lung CA: never smoker  Breast CA: last elodia ~2 yrs ago. Due for mammo  Colon CA: has not made an appt yet. Dr. Cifuentes. Last colonoscopies 5+ years ago per patient. States had 1 polyp. FH of colon CA in father (early age 60's), daughter (40's).   DEXA: states over 2+ years ago, denies any osteopenia. Denies FH of hip fractures. On calcium+vitd3 OTC.   Vaccines: Received Flu last visit. States UTD with Tetanus, Shingles. Received COVID 2021, 2021, 2022.   Needs PCV    Past Medical History:   Diagnosis Date    Diabetes mellitus     Diabetes mellitus, type 2     Hypertension       No past surgical history on file.   No family history on file.   Social History     Socioeconomic History    Marital status:     Number of children: 3   Occupational History    Occupation: retired   Tobacco Use    Smoking status: Never     Passive exposure: Never    Smokeless tobacco: Never   Substance and Sexual Activity    Alcohol use: Never    Drug use: Never    Sexual activity: Not Currently     Partners: Male     Social Determinants of Health     Financial Resource Strain: Low Risk  (12/6/2023)    Overall Financial Resource Strain (CARDIA)     Difficulty of  Paying Living Expenses: Not hard at all   Food Insecurity: No Food Insecurity (12/6/2023)    Hunger Vital Sign     Worried About Running Out of Food in the Last Year: Never true     Ran Out of Food in the Last Year: Never true   Transportation Needs: No Transportation Needs (3/6/2024)    PRAPARE - Transportation     Lack of Transportation (Medical): No     Lack of Transportation (Non-Medical): No   Physical Activity: Inactive (12/6/2023)    Exercise Vital Sign     Days of Exercise per Week: 0 days     Minutes of Exercise per Session: 0 min   Stress: No Stress Concern Present (12/6/2023)    [employer] - Occupational Stress Questionnaire     Feeling of Stress : Not at all   Housing Stability: Low Risk  (12/6/2023)    Housing Stability Vital Sign     Unable to Pay for Housing in the Last Year: No     Number of Places Lived in the Last Year: 1     Unstable Housing in the Last Year: No        Health Maintenance Reviewed:  Immunization History   Administered Date(s) Administered    COVID-19, MRNA, LN-S, PF (MODERNA FULL 0.5 ML DOSE) 02/04/2021, 03/04/2021    COVID-19, mRNA, LNP-S, bivalent booster, PF (PFIZER OMICRON) 10/13/2022    Influenza (FLUAD) - Quadrivalent - Adjuvanted - PF *Preferred* (65+) 10/10/2023    Influenza - Quadrivalent - High Dose - PF (65 years and older) 11/17/2022    Pneumococcal Conjugate - 20 Valent 06/17/2024     Review of patient's allergies indicates:  No Known Allergies       Current Outpatient Medications:     nitroGLYCERIN (NITROSTAT) 0.4 MG SL tablet, Place 0.4 mg under the tongue every 5 (five) minutes as needed for Chest pain., Disp: , Rfl:     aspirin (ECOTRIN) 81 MG EC tablet, Take 81 mg by mouth once daily., Disp: , Rfl:     atorvastatin (LIPITOR) 80 MG tablet, Take 1 tablet (80 mg total) by mouth every evening., Disp: 90 tablet, Rfl: 1    benazepriL (LOTENSIN) 40 MG tablet, Take 1 tablet (40 mg total) by mouth once daily., Disp: 90 tablet, Rfl: 1     "citalopram (CELEXA) 20 MG tablet, Take 1 tablet (20 mg total) by mouth once daily., Disp: 90 tablet, Rfl: 1    diclofenac sodium (VOLTAREN) 1 % Gel, Apply 2 g topically once daily., Disp: 350 g, Rfl: 1    ezetimibe (ZETIA) 10 mg tablet, Take 1 tablet (10 mg total) by mouth once daily., Disp: 90 tablet, Rfl: 1    mirtazapine (REMERON) 15 MG tablet, Take 1 tablet (15 mg total) by mouth nightly as needed., Disp: 90 tablet, Rfl: 1     Review of Systems   Constitutional:  Negative for fever.   Respiratory:  Negative for shortness of breath.    Cardiovascular:  Negative for leg swelling.        Objective:   Vitals:    06/17/24 1422   BP: (!) 144/79   BP Location: Left arm   Patient Position: Sitting   BP Method: Large (Automatic)   Pulse: 63   Resp: 20   Temp: 98.2 °F (36.8 °C)   TempSrc: Oral   SpO2: 100%   Weight: 82.7 kg (182 lb 5.1 oz)   Height: 5' 5" (1.651 m)        Physical Exam  Constitutional:       General: She is not in acute distress.     Appearance: She is obese. She is not toxic-appearing.   HENT:      Right Ear: Tympanic membrane, ear canal and external ear normal. There is no impacted cerumen.      Left Ear: Tympanic membrane, ear canal and external ear normal. There is no impacted cerumen.      Mouth/Throat:      Mouth: Mucous membranes are moist.      Pharynx: Oropharynx is clear.   Eyes:      Conjunctiva/sclera: Conjunctivae normal.   Cardiovascular:      Rate and Rhythm: Normal rate and regular rhythm.      Heart sounds: No murmur heard.     No friction rub. No gallop.   Pulmonary:      Effort: Pulmonary effort is normal. No respiratory distress.      Breath sounds: Normal breath sounds. No wheezing or rales.   Abdominal:      General: Abdomen is flat. Bowel sounds are normal. There is no distension.      Palpations: Abdomen is soft.      Tenderness: There is no abdominal tenderness. There is no guarding.   Musculoskeletal:      Right lower leg: No edema.      Left lower leg: No edema.   Neurological: "      General: No focal deficit present.      Mental Status: She is alert and oriented to person, place, and time.        Depression Assessment:       3/6/2024    12:04 PM 12/6/2023    12:48 PM 12/6/2023    12:23 PM 12/6/2023    11:03 AM 10/10/2023     1:39 PM 6/19/2023    11:55 AM 3/15/2023     1:08 PM   Depression Patient Health Questionnaire   Over the last two weeks how often have you been bothered by little interest or pleasure in doing things Not at all Not at all Not at all Not at all Not at all Not at all Not at all   Over the last two weeks how often have you been bothered by feeling down, depressed or hopeless Not at all More than half the days Several days Several days Not at all Not at all Not at all   PHQ-2 Total Score 0 2 1 1 0 0 0   Over the last two weeks how often have you been bothered by trouble falling or staying asleep, or sleeping too much  Not at all        Over the last two weeks how often have you been bothered by feeling tired or having little energy  Not at all        Over the last two weeks how often have you been bothered by a poor appetite or overeating  Not at all        Over the last two weeks how often have you been bothered by feeling bad about yourself - or that you are a failure or have let yourself or your family down  Not at all        Over the last two weeks how often have you been bothered by trouble concentrating on things, such as reading the newspaper or watching television  Not at all        Over the last two weeks how often have you been bothered by moving or speaking so slowly that other people could have noticed. Or the opposite - being so fidgety or restless that you have been moving around a lot more than usual.  Not at all        Over the last two weeks how often have you been bothered by thoughts that you would be better off dead, or of hurting yourself  Not at all        If you checked off any problems, how difficult have these problems made it for you to do your  work, take care of things at home or get along with other people?  Not difficult at all        PHQ-9 Score  2        PHQ-9 Interpretation  Minimal or None          Recent labs:  CBC:  Lab Results   Component Value Date    WBC 5.18 06/17/2024    RBC 3.47 (L) 06/17/2024    HGB 9.5 (L) 06/17/2024    HCT 28.8 (L) 06/17/2024    MCV 83.0 06/17/2024    MCH 27.4 06/17/2024    MCHC 33.0 06/17/2024    RDW 16.9 06/17/2024     06/17/2024    MPV 10.2 06/17/2024      CMP:  Sodium   Date Value Ref Range Status   06/17/2024 140 136 - 145 mmol/L Final   08/08/2020 137 136 - 145 mmol/L Final     Potassium   Date Value Ref Range Status   06/17/2024 4.1 3.5 - 5.1 mmol/L Final   08/08/2020 4.4 3.5 - 5.1 mmol/L Final     Chloride   Date Value Ref Range Status   06/17/2024 106 98 - 107 mmol/L Final   08/08/2020 104 100 - 109 mmol/L Final     CO2   Date Value Ref Range Status   06/17/2024 27 23 - 31 mmol/L Final     Carbon Dioxide   Date Value Ref Range Status   08/08/2020 24 22 - 33 mmol/L Final     Blood Urea Nitrogen   Date Value Ref Range Status   06/17/2024 18.2 9.8 - 20.1 mg/dL Final   08/08/2020 22 5 - 25 mg/dL Final     Creatinine   Date Value Ref Range Status   06/17/2024 1.11 (H) 0.55 - 1.02 mg/dL Final   08/08/2020 0.70 0.57 - 1.25 mg/dL Final     Calcium   Date Value Ref Range Status   06/17/2024 9.8 8.4 - 10.2 mg/dL Final   08/08/2020 8.6 (L) 8.8 - 10.6 mg/dL Final     Albumin   Date Value Ref Range Status   06/17/2024 3.9 3.4 - 4.8 g/dL Final     Bilirubin Total   Date Value Ref Range Status   06/17/2024 0.5 <=1.5 mg/dL Final     ALP   Date Value Ref Range Status   06/17/2024 57 40 - 150 unit/L Final     AST   Date Value Ref Range Status   06/17/2024 17 5 - 34 unit/L Final     ALT   Date Value Ref Range Status   06/17/2024 16 0 - 55 unit/L Final     Anion Gap   Date Value Ref Range Status   08/08/2020 9 8 - 16 mmol/L Final     eGFR    Date Value Ref Range Status   08/08/2020 99 >=60 mL/min/1.73mSq Final  "     BMP:  Lab Results   Component Value Date     06/17/2024    K 4.1 06/17/2024     06/17/2024    CO2 27 06/17/2024    BUN 18.2 06/17/2024    CREATININE 1.11 (H) 06/17/2024    CALCIUM 9.8 06/17/2024    ANIONGAP 9 08/08/2020    ESTGFRAFRICA 99 08/08/2020      Lipid Panel:  Lab Results   Component Value Date    CHOL 143 06/17/2024    CHOL 153 03/06/2024    CHOL 234 (H) 12/06/2023     Lab Results   Component Value Date    HDL 46 06/17/2024    HDL 50 03/06/2024    HDL 42 12/06/2023     No results found for: "LDLCALC"  Lab Results   Component Value Date    TRIG 101 06/17/2024    TRIG 113 03/06/2024    TRIG 151 (H) 12/06/2023     No results found for: "CHOLHDL"   HbA1c:  Lab Results   Component Value Date    HGBA1C 5.6 06/17/2024      TSH:  Lab Results   Component Value Date    TSH 1.854 03/15/2023       Recent Imaging:  Mammo Digital Screening Bilat w/ Tawny   - MAMMO DIGITAL SCREENING BILAT WITH TAWNY    BILATERAL DIGITAL SCREENING MAMMOGRAM 3D/2D WITH CAD: 12/26/2023  HISTORY: 78-year-old woman presents for screening mammogram.      COMPARISONS: Comparison is made to exams dated:  11/4/2022 mammogram, 10/2/2018 mammogram, 8/16/2017 mammogram, 9/11/2015 mammogram, 8/29/2014 mammogram, and 2/2/2009 mammogram - Breast [facility].      TECHNIQUE: Digital mammography views were performed with tomosynthesis. Current study was evaluated with a Computer Aided Detection (CAD) system.     BREAST COMPOSITION: There are scattered areas of fibroglandular tissue.      FINDINGS:   A cardiac monitor device (loop recorder) overlies the left inner breast, posterior depth, partially obscuring radiographic findings.     No suspicious mass, asymmetry, distortion, or calcification is identified.     IMPRESSION: BENIGN  Right Breast: Negative (BI-RADS 1)  Left Breast: Benign (BI-RADS 2)    Recommendations:  Recommend continued annual screening mammography, according to American College of Radiology guidelines.    Thomas " Brianna ZHU            lm/:12/26/2023 16:25:29      letter sent: Mammography Normal    Mammogram BI-RADS: 2 Benign       Assessment & Plan:    Gladys Dominguez is presenting as above and will be treated as follows:    1. Essential hypertension  - Well controlled, continue current regimen     2. High cholesterol  - Lipid panel improved w/ high dose statin and Zetia   - Repeat today     4. Type 2 diabetes mellitus without complication, without long-term current use of insulin  - Hemoglobin A1C; Future  - Hemoglobin A1C    5. Immunization due  - PVC 20 today     7. Insomnia, unspecified type  - mirtazapine (REMERON) 15 MG tablet; Take 1 tablet (15 mg total) by mouth nightly as needed.  Dispense: 90 tablet; Refill: 1    Check routine labs, CBC/CMP/lipids/A1c   RTC 3 months

## 2024-09-23 ENCOUNTER — OFFICE VISIT (OUTPATIENT)
Dept: FAMILY MEDICINE | Facility: CLINIC | Age: 79
End: 2024-09-23
Payer: MEDICARE

## 2024-09-23 VITALS
HEIGHT: 65 IN | BODY MASS INDEX: 29.49 KG/M2 | TEMPERATURE: 99 F | WEIGHT: 177 LBS | DIASTOLIC BLOOD PRESSURE: 82 MMHG | HEART RATE: 64 BPM | OXYGEN SATURATION: 99 % | RESPIRATION RATE: 20 BRPM | SYSTOLIC BLOOD PRESSURE: 136 MMHG

## 2024-09-23 DIAGNOSIS — D64.9 ANEMIA, UNSPECIFIED TYPE: ICD-10-CM

## 2024-09-23 DIAGNOSIS — M85.80 OSTEOPENIA, UNSPECIFIED LOCATION: ICD-10-CM

## 2024-09-23 DIAGNOSIS — N95.9 UNSPECIFIED MENOPAUSAL AND PERIMENOPAUSAL DISORDER: ICD-10-CM

## 2024-09-23 DIAGNOSIS — Z23 NEED FOR SHINGLES VACCINE: ICD-10-CM

## 2024-09-23 DIAGNOSIS — I10 ESSENTIAL HYPERTENSION: Primary | ICD-10-CM

## 2024-09-23 DIAGNOSIS — N95.1 POST MENOPAUSAL SYNDROME: ICD-10-CM

## 2024-09-23 PROBLEM — E11.9 DIABETES MELLITUS: Status: RESOLVED | Noted: 2023-12-06 | Resolved: 2024-09-23

## 2024-09-23 LAB
ALBUMIN SERPL-MCNC: 3.9 G/DL (ref 3.4–4.8)
ALBUMIN/GLOB SERPL: 1.2 RATIO (ref 1.1–2)
ALP SERPL-CCNC: 42 UNIT/L (ref 40–150)
ALT SERPL-CCNC: 21 UNIT/L (ref 0–55)
ANION GAP SERPL CALC-SCNC: 8 MEQ/L
AST SERPL-CCNC: 21 UNIT/L (ref 5–34)
BASOPHILS # BLD AUTO: 0.03 X10(3)/MCL
BASOPHILS NFR BLD AUTO: 0.5 %
BILIRUB SERPL-MCNC: 0.6 MG/DL
BUN SERPL-MCNC: 20.8 MG/DL (ref 9.8–20.1)
CALCIUM SERPL-MCNC: 10 MG/DL (ref 8.4–10.2)
CHLORIDE SERPL-SCNC: 103 MMOL/L (ref 98–107)
CO2 SERPL-SCNC: 27 MMOL/L (ref 23–31)
CREAT SERPL-MCNC: 1.07 MG/DL (ref 0.55–1.02)
CREAT/UREA NIT SERPL: 19
EOSINOPHIL # BLD AUTO: 0.1 X10(3)/MCL (ref 0–0.9)
EOSINOPHIL NFR BLD AUTO: 1.8 %
ERYTHROCYTE [DISTWIDTH] IN BLOOD BY AUTOMATED COUNT: 17 % (ref 11.5–17)
GFR SERPLBLD CREATININE-BSD FMLA CKD-EPI: 53 ML/MIN/1.73/M2
GLOBULIN SER-MCNC: 3.3 GM/DL (ref 2.4–3.5)
GLUCOSE SERPL-MCNC: 76 MG/DL (ref 82–115)
HCT VFR BLD AUTO: 27.9 % (ref 37–47)
HGB BLD-MCNC: 9.4 G/DL (ref 12–16)
IMM GRANULOCYTES # BLD AUTO: 0.02 X10(3)/MCL (ref 0–0.04)
IMM GRANULOCYTES NFR BLD AUTO: 0.4 %
LYMPHOCYTES # BLD AUTO: 1.41 X10(3)/MCL (ref 0.6–4.6)
LYMPHOCYTES NFR BLD AUTO: 24.7 %
MCH RBC QN AUTO: 27.6 PG (ref 27–31)
MCHC RBC AUTO-ENTMCNC: 33.7 G/DL (ref 33–36)
MCV RBC AUTO: 81.8 FL (ref 80–94)
MONOCYTES # BLD AUTO: 0.47 X10(3)/MCL (ref 0.1–1.3)
MONOCYTES NFR BLD AUTO: 8.2 %
NEUTROPHILS # BLD AUTO: 3.67 X10(3)/MCL (ref 2.1–9.2)
NEUTROPHILS NFR BLD AUTO: 64.4 %
NRBC BLD AUTO-RTO: 0 %
PLATELET # BLD AUTO: 277 X10(3)/MCL (ref 130–400)
PMV BLD AUTO: 10 FL (ref 7.4–10.4)
POTASSIUM SERPL-SCNC: 4 MMOL/L (ref 3.5–5.1)
PROT SERPL-MCNC: 7.2 GM/DL (ref 5.8–7.6)
RBC # BLD AUTO: 3.41 X10(6)/MCL (ref 4.2–5.4)
SODIUM SERPL-SCNC: 138 MMOL/L (ref 136–145)
WBC # BLD AUTO: 5.7 X10(3)/MCL (ref 4.5–11.5)

## 2024-09-23 PROCEDURE — 90750 HZV VACC RECOMBINANT IM: CPT | Mod: PBBFAC

## 2024-09-23 PROCEDURE — 80053 COMPREHEN METABOLIC PANEL: CPT | Performed by: FAMILY MEDICINE

## 2024-09-23 PROCEDURE — 36415 COLL VENOUS BLD VENIPUNCTURE: CPT | Performed by: FAMILY MEDICINE

## 2024-09-23 PROCEDURE — 99215 OFFICE O/P EST HI 40 MIN: CPT | Mod: PBBFAC | Performed by: FAMILY MEDICINE

## 2024-09-23 PROCEDURE — 85025 COMPLETE CBC W/AUTO DIFF WBC: CPT | Performed by: FAMILY MEDICINE

## 2024-09-23 PROCEDURE — 90471 IMMUNIZATION ADMIN: CPT | Mod: PBBFAC

## 2024-09-23 RX ADMIN — Medication 0.5 ML: at 02:09

## 2024-09-23 NOTE — PATIENT INSTRUCTIONS
Please call GI doctor to make follow up appointment for repeat colonoscopy - if they dont accept your insurance, let us know we will make a new referral.

## 2024-09-24 NOTE — PROGRESS NOTES
[facility]  [facility] Medicine    DOS: 9/23/2024      Subjective:  Chief Complaint:    Chief Complaint   Patient presents with    Follow-up       History of Present Illness:  Gladys Dominguez is a 79 y.o. female with a PMH of HTN, HLD, depression, DM, insomnia, CAD (MI s/p PCTA 8/2020 on ASA), hx of PAF (after COVID, completed Eliquis).  Who presents today for: follow up of chronic conditions    Patients previous concerns of Right posterior hip/buttock pain continues.  Patient notes when she massages the area, uses heating pad and tylenol, they improve, it is intermittent pain.  We discussed likely to be muscle strain or spasm, and I showed patient exercises to do at home for stretching.     Previous back pain is stable, relieved with tylenol. No further leg swelling.  Mood, appetite, sleep, all good on citalopram and mirtazapine.     Review of BP today stable, within goal for age, patient states she when she checks intermittently at home it is syst 120-130s range.     We reviewed prior labs - patient with chronic anemia but not iron deficient or macrocytic, b12 and folate wnl.   Will recheck those levels to ensure no further drop.   Last CMP also showed mild elevation in creatinine possible dehydration , patient has been on benazepril long term likely not contributing.  Will recheck today, encourage patient to stay hydrated.     Health maintenance reviewed   - mammogram - patient due in December, will wait to reorder at next follow up.   - colonoscopy - Patient reminded to call to schedule next colonoscopy , had polyps at last procedure, likely will not need further testing after this as age 79 as long as benign polyps.     - Dexa scan - due now, will order  - due for shingles - will order    A1cs - persistently below 6 for past 2 years, does not need diabetes screening tests at this time.     Social History     Socioeconomic History    Marital status:     Number of  children: 3   Occupational History    Occupation: retired   Tobacco Use    Smoking status: Never     Passive exposure: Never    Smokeless tobacco: Never   Substance and Sexual Activity    Alcohol use: Never    Drug use: Never    Sexual activity: Not Currently     Partners: Male     Social Determinants of Health     Financial Resource Strain: Low Risk  (12/6/2023)    Overall Financial Resource Strain (CARDIA)     Difficulty of Paying Living Expenses: Not hard at all   Food Insecurity: No Food Insecurity (12/6/2023)    Hunger Vital Sign     Worried About Running Out of Food in the Last Year: Never true     Ran Out of Food in the Last Year: Never true   Transportation Needs: No Transportation Needs (3/6/2024)    PRAPARE - Transportation     Lack of Transportation (Medical): No     Lack of Transportation (Non-Medical): No   Physical Activity: Inactive (12/6/2023)    Exercise Vital Sign     Days of Exercise per Week: 0 days     Minutes of Exercise per Session: 0 min   Stress: No Stress Concern Present (12/6/2023)    [employer] - Occupational Stress Questionnaire     Feeling of Stress : Not at all   Housing Stability: Low Risk  (12/6/2023)    Housing Stability Vital Sign     Unable to Pay for Housing in the Last Year: No     Number of Places Lived in the Last Year: 1     Unstable Housing in the Last Year: No        Review of patient's allergies indicates:  No Known Allergies     Current Outpatient Medications:     aspirin (ECOTRIN) 81 MG EC tablet, Take 81 mg by mouth once daily., Disp: , Rfl:     atorvastatin (LIPITOR) 80 MG tablet, Take 1 tablet (80 mg total) by mouth every evening., Disp: 90 tablet, Rfl: 1    benazepriL (LOTENSIN) 40 MG tablet, Take 1 tablet (40 mg total) by mouth once daily., Disp: 90 tablet, Rfl: 1    citalopram (CELEXA) 20 MG tablet, Take 1 tablet (20 mg total) by mouth once daily., Disp: 90 tablet, Rfl: 1    diclofenac sodium (VOLTAREN) 1 % Gel, Apply 2 g topically  "once daily., Disp: 350 g, Rfl: 1    ezetimibe (ZETIA) 10 mg tablet, Take 1 tablet (10 mg total) by mouth once daily., Disp: 90 tablet, Rfl: 1    mirtazapine (REMERON) 15 MG tablet, Take 1 tablet (15 mg total) by mouth nightly as needed., Disp: 90 tablet, Rfl: 1    nitroGLYCERIN (NITROSTAT) 0.4 MG SL tablet, Place 0.4 mg under the tongue every 5 (five) minutes as needed for Chest pain., Disp: , Rfl:      Review of Systems:  Review of Systems   Constitutional:  Negative for chills, fever, malaise/fatigue and weight loss.   Respiratory:  Negative for shortness of breath.    Cardiovascular:  Negative for chest pain, palpitations and leg swelling.   Gastrointestinal:  Negative for abdominal pain, constipation, diarrhea, nausea and vomiting.   Musculoskeletal:  Positive for joint pain. Negative for back pain and falls.   Neurological:  Negative for tingling, weakness and headaches.   Psychiatric/Behavioral:  Negative for suicidal ideas. The patient is not nervous/anxious.      Objective:   Vitals:    09/23/24 1344   BP: 136/82   BP Location: Left arm   Patient Position: Sitting   BP Method: Large (Manual)   Pulse: 64   Resp: 20   Temp: 99.2 °F (37.3 °C)   TempSrc: Oral   SpO2: 99%   Weight: 80.3 kg (177 lb)   Height: 5' 5" (1.651 m)     Physical Exam  Vitals reviewed.   Constitutional:       General: She is not in acute distress.     Appearance: Normal appearance.   HENT:      Head: Normocephalic and atraumatic.      Nose: Nose normal.   Eyes:      Extraocular Movements: Extraocular movements intact.      Conjunctiva/sclera: Conjunctivae normal.      Pupils: Pupils are equal, round, and reactive to light.   Cardiovascular:      Rate and Rhythm: Normal rate and regular rhythm.      Pulses: Normal pulses.      Heart sounds: Normal heart sounds.   Pulmonary:      Effort: Pulmonary effort is normal. No respiratory distress.      Breath sounds: Normal breath sounds. No wheezing, rhonchi or rales.   Abdominal:      General: " Abdomen is flat. Bowel sounds are normal. There is no distension.      Palpations: Abdomen is soft.      Tenderness: There is no abdominal tenderness.   Musculoskeletal:         General: No swelling or tenderness. Normal range of motion.      Cervical back: Normal range of motion.   Skin:     General: Skin is warm and dry.   Neurological:      General: No focal deficit present.      Mental Status: She is alert and oriented to person, place, and time. Mental status is at baseline.      Motor: No weakness.   Psychiatric:         Mood and Affect: Mood normal.          Recent labs:  CBC:  Lab Results   Component Value Date    WBC 5.70 09/23/2024    RBC 3.41 (L) 09/23/2024    HGB 9.4 (L) 09/23/2024    HCT 27.9 (L) 09/23/2024    MCV 81.8 09/23/2024    MCH 27.6 09/23/2024    MCHC 33.7 09/23/2024    RDW 17.0 09/23/2024     09/23/2024    MPV 10.0 09/23/2024      CMP:  Sodium   Date Value Ref Range Status   09/23/2024 138 136 - 145 mmol/L Final   08/08/2020 137 136 - 145 mmol/L Final     Potassium   Date Value Ref Range Status   09/23/2024 4.0 3.5 - 5.1 mmol/L Final   08/08/2020 4.4 3.5 - 5.1 mmol/L Final     Chloride   Date Value Ref Range Status   09/23/2024 103 98 - 107 mmol/L Final   08/08/2020 104 100 - 109 mmol/L Final     CO2   Date Value Ref Range Status   09/23/2024 27 23 - 31 mmol/L Final     Carbon Dioxide   Date Value Ref Range Status   08/08/2020 24 22 - 33 mmol/L Final     Blood Urea Nitrogen   Date Value Ref Range Status   09/23/2024 20.8 (H) 9.8 - 20.1 mg/dL Final   08/08/2020 22 5 - 25 mg/dL Final     Creatinine   Date Value Ref Range Status   09/23/2024 1.07 (H) 0.55 - 1.02 mg/dL Final   08/08/2020 0.70 0.57 - 1.25 mg/dL Final     Calcium   Date Value Ref Range Status   09/23/2024 10.0 8.4 - 10.2 mg/dL Final   08/08/2020 8.6 (L) 8.8 - 10.6 mg/dL Final     Albumin   Date Value Ref Range Status   09/23/2024 3.9 3.4 - 4.8 g/dL Final     Bilirubin Total   Date Value Ref Range Status   09/23/2024 0.6 <=1.5  "mg/dL Final     ALP   Date Value Ref Range Status   09/23/2024 42 40 - 150 unit/L Final     AST   Date Value Ref Range Status   09/23/2024 21 5 - 34 unit/L Final     ALT   Date Value Ref Range Status   09/23/2024 21 0 - 55 unit/L Final     Anion Gap   Date Value Ref Range Status   08/08/2020 9 8 - 16 mmol/L Final     eGFR    Date Value Ref Range Status   08/08/2020 99 >=60 mL/min/1.73mSq Final      BMP:  Lab Results   Component Value Date     09/23/2024    K 4.0 09/23/2024     09/23/2024    CO2 27 09/23/2024    BUN 20.8 (H) 09/23/2024    CREATININE 1.07 (H) 09/23/2024    CALCIUM 10.0 09/23/2024    ANIONGAP 9 08/08/2020    ESTGFRAFRICA 99 08/08/2020      Lipid Panel:  Lab Results   Component Value Date    CHOL 143 06/17/2024    CHOL 153 03/06/2024    CHOL 234 (H) 12/06/2023     Lab Results   Component Value Date    HDL 46 06/17/2024    HDL 50 03/06/2024    HDL 42 12/06/2023     No results found for: "LDLCALC"  Lab Results   Component Value Date    TRIG 101 06/17/2024    TRIG 113 03/06/2024    TRIG 151 (H) 12/06/2023     No results found for: "CHOLHDL"   HbA1c:  Lab Results   Component Value Date    HGBA1C 5.6 06/17/2024      TSH:  Lab Results   Component Value Date    TSH 1.854 03/15/2023     Assessment & Plan:    Gladys Dominguez is presenting as above and will be treated as follows:  Gladys was seen today for follow-up.    Diagnoses and all orders for this visit:    Essential hypertension  -     Comprehensive Metabolic Panel; Future  -     Comprehensive Metabolic Panel    Osteopenia, unspecified location  -     DXA Bone Density Axial Skeleton 1 or more sites; Future    Post menopausal syndrome  -     DXA Bone Density Axial Skeleton 1 or more sites; Future    Unspecified menopausal and perimenopausal disorder  -     DXA Bone Density Axial Skeleton 1 or more sites; Future    Anemia, unspecified type  -     CBC Auto Differential; Future  -     CBC Auto Differential    Need for shingles vaccine  -  "    varicella zoster (Shingrix) IM vaccine (>/= 49 yo)          RTC in 3 months     Nany Hansen MD  Attending - Family Medicine / Geriatric Medicine  [facility] [place]

## 2024-10-10 ENCOUNTER — HOSPITAL ENCOUNTER (OUTPATIENT)
Dept: RADIOLOGY | Facility: HOSPITAL | Age: 79
Discharge: HOME | End: 2024-10-10
Attending: FAMILY MEDICINE
Payer: MEDICARE

## 2024-10-10 DIAGNOSIS — N95.9 UNSPECIFIED MENOPAUSAL AND PERIMENOPAUSAL DISORDER: ICD-10-CM

## 2024-10-10 DIAGNOSIS — M85.80 OSTEOPENIA, UNSPECIFIED LOCATION: ICD-10-CM

## 2024-10-10 DIAGNOSIS — N95.1 POST MENOPAUSAL SYNDROME: ICD-10-CM

## 2024-10-10 PROCEDURE — 77080 DXA BONE DENSITY AXIAL: CPT | Mod: TC

## 2024-12-17 DIAGNOSIS — F41.9 ANXIETY: ICD-10-CM

## 2024-12-19 ENCOUNTER — OFFICE VISIT (OUTPATIENT)
Dept: FAMILY MEDICINE | Facility: CLINIC | Age: 79
End: 2024-12-19
Payer: MEDICARE

## 2024-12-19 VITALS
TEMPERATURE: 98 F | WEIGHT: 175.69 LBS | OXYGEN SATURATION: 100 % | SYSTOLIC BLOOD PRESSURE: 128 MMHG | HEIGHT: 65 IN | BODY MASS INDEX: 29.27 KG/M2 | HEART RATE: 65 BPM | DIASTOLIC BLOOD PRESSURE: 78 MMHG

## 2024-12-19 DIAGNOSIS — I10 ESSENTIAL HYPERTENSION: ICD-10-CM

## 2024-12-19 DIAGNOSIS — Z23 INFLUENZA VACCINATION GIVEN: ICD-10-CM

## 2024-12-19 DIAGNOSIS — E11.9 TYPE 2 DIABETES MELLITUS WITHOUT COMPLICATION, WITHOUT LONG-TERM CURRENT USE OF INSULIN: Primary | ICD-10-CM

## 2024-12-19 DIAGNOSIS — Z12.11 COLON CANCER SCREENING: ICD-10-CM

## 2024-12-19 LAB — HBA1C MFR BLD: 5.8 %

## 2024-12-19 PROCEDURE — 99214 OFFICE O/P EST MOD 30 MIN: CPT | Mod: PBBFAC | Performed by: FAMILY MEDICINE

## 2024-12-19 RX ORDER — DORZOLAMIDE HYDROCHLORIDE AND TIMOLOL MALEATE 20; 5 MG/ML; MG/ML
1 SOLUTION/ DROPS OPHTHALMIC 2 TIMES DAILY
COMMUNITY
Start: 2024-11-21

## 2024-12-19 RX ORDER — CITALOPRAM 20 MG/1
20 TABLET, FILM COATED ORAL
Qty: 90 TABLET | Refills: 1 | Status: SHIPPED | OUTPATIENT
Start: 2024-12-19

## 2024-12-20 DIAGNOSIS — E78.00 HIGH CHOLESTEROL: ICD-10-CM

## 2024-12-23 RX ORDER — EZETIMIBE 10 MG/1
10 TABLET ORAL DAILY
Qty: 90 TABLET | Refills: 1 | Status: SHIPPED | OUTPATIENT
Start: 2024-12-23 | End: 2025-06-21

## 2025-03-19 ENCOUNTER — OFFICE VISIT (OUTPATIENT)
Dept: FAMILY MEDICINE | Facility: CLINIC | Age: 80
End: 2025-03-19
Payer: MEDICARE

## 2025-03-19 VITALS
OXYGEN SATURATION: 98 % | BODY MASS INDEX: 29.38 KG/M2 | DIASTOLIC BLOOD PRESSURE: 80 MMHG | TEMPERATURE: 99 F | HEIGHT: 65 IN | SYSTOLIC BLOOD PRESSURE: 152 MMHG | HEART RATE: 68 BPM | WEIGHT: 176.38 LBS

## 2025-03-19 DIAGNOSIS — G89.29 CHRONIC PAIN OF BOTH HIPS: ICD-10-CM

## 2025-03-19 DIAGNOSIS — M25.551 CHRONIC PAIN OF BOTH HIPS: ICD-10-CM

## 2025-03-19 DIAGNOSIS — M25.552 CHRONIC PAIN OF BOTH HIPS: ICD-10-CM

## 2025-03-19 DIAGNOSIS — I10 ESSENTIAL HYPERTENSION: ICD-10-CM

## 2025-03-19 DIAGNOSIS — R26.89 IMBALANCE: ICD-10-CM

## 2025-03-19 DIAGNOSIS — I48.0 PAROXYSMAL ATRIAL FIBRILLATION: Primary | ICD-10-CM

## 2025-03-19 DIAGNOSIS — N18.31 CHRONIC KIDNEY DISEASE, STAGE 3A: ICD-10-CM

## 2025-03-19 DIAGNOSIS — I25.10 ARTERIOSCLEROSIS OF CORONARY ARTERY: ICD-10-CM

## 2025-03-19 LAB
25(OH)D3+25(OH)D2 SERPL-MCNC: 66 NG/ML (ref 30–80)
ALBUMIN SERPL-MCNC: 3.7 G/DL (ref 3.4–4.8)
ALBUMIN/GLOB SERPL: 0.9 RATIO (ref 1.1–2)
ALP SERPL-CCNC: 44 UNIT/L (ref 40–150)
ALT SERPL-CCNC: 8 UNIT/L (ref 0–55)
ANION GAP SERPL CALC-SCNC: 4 MEQ/L
AST SERPL-CCNC: 16 UNIT/L (ref 5–34)
BASOPHILS # BLD AUTO: 0.05 X10(3)/MCL
BASOPHILS NFR BLD AUTO: 0.9 %
BILIRUB SERPL-MCNC: 0.5 MG/DL
BUN SERPL-MCNC: 16.1 MG/DL (ref 9.8–20.1)
CALCIUM SERPL-MCNC: 9.6 MG/DL (ref 8.4–10.2)
CHLORIDE SERPL-SCNC: 107 MMOL/L (ref 98–107)
CO2 SERPL-SCNC: 28 MMOL/L (ref 23–31)
CREAT SERPL-MCNC: 0.83 MG/DL (ref 0.55–1.02)
CREAT/UREA NIT SERPL: 19
EOSINOPHIL # BLD AUTO: 0.11 X10(3)/MCL (ref 0–0.9)
EOSINOPHIL NFR BLD AUTO: 2 %
ERYTHROCYTE [DISTWIDTH] IN BLOOD BY AUTOMATED COUNT: 16.5 % (ref 11.5–17)
GFR SERPLBLD CREATININE-BSD FMLA CKD-EPI: >60 ML/MIN/1.73/M2
GLOBULIN SER-MCNC: 3.9 GM/DL (ref 2.4–3.5)
GLUCOSE SERPL-MCNC: 73 MG/DL (ref 82–115)
HCT VFR BLD AUTO: 30.8 % (ref 37–47)
HGB BLD-MCNC: 9.9 G/DL (ref 12–16)
IMM GRANULOCYTES # BLD AUTO: 0.01 X10(3)/MCL (ref 0–0.04)
IMM GRANULOCYTES NFR BLD AUTO: 0.2 %
LYMPHOCYTES # BLD AUTO: 1.33 X10(3)/MCL (ref 0.6–4.6)
LYMPHOCYTES NFR BLD AUTO: 23.8 %
MCH RBC QN AUTO: 26.3 PG (ref 27–31)
MCHC RBC AUTO-ENTMCNC: 32.1 G/DL (ref 33–36)
MCV RBC AUTO: 81.9 FL (ref 80–94)
MONOCYTES # BLD AUTO: 0.57 X10(3)/MCL (ref 0.1–1.3)
MONOCYTES NFR BLD AUTO: 10.2 %
NEUTROPHILS # BLD AUTO: 3.53 X10(3)/MCL (ref 2.1–9.2)
NEUTROPHILS NFR BLD AUTO: 62.9 %
NRBC BLD AUTO-RTO: 0 %
OHS QRS DURATION: 70 MS
OHS QTC CALCULATION: 406 MS
PLATELET # BLD AUTO: 290 X10(3)/MCL (ref 130–400)
PMV BLD AUTO: 9.8 FL (ref 7.4–10.4)
POTASSIUM SERPL-SCNC: 3.8 MMOL/L (ref 3.5–5.1)
PROT SERPL-MCNC: 7.6 GM/DL (ref 5.8–7.6)
RBC # BLD AUTO: 3.76 X10(6)/MCL (ref 4.2–5.4)
SODIUM SERPL-SCNC: 139 MMOL/L (ref 136–145)
TSH SERPL-ACNC: 2.28 UIU/ML (ref 0.35–4.94)
WBC # BLD AUTO: 5.6 X10(3)/MCL (ref 4.5–11.5)

## 2025-03-19 PROCEDURE — 93005 ELECTROCARDIOGRAM TRACING: CPT

## 2025-03-19 PROCEDURE — 84443 ASSAY THYROID STIM HORMONE: CPT | Performed by: FAMILY MEDICINE

## 2025-03-19 PROCEDURE — 99215 OFFICE O/P EST HI 40 MIN: CPT | Mod: PBBFAC,25 | Performed by: FAMILY MEDICINE

## 2025-03-19 PROCEDURE — 82306 VITAMIN D 25 HYDROXY: CPT | Performed by: FAMILY MEDICINE

## 2025-03-19 PROCEDURE — 80053 COMPREHEN METABOLIC PANEL: CPT | Performed by: FAMILY MEDICINE

## 2025-03-19 PROCEDURE — 85025 COMPLETE CBC W/AUTO DIFF WBC: CPT | Performed by: FAMILY MEDICINE

## 2025-03-19 PROCEDURE — 36415 COLL VENOUS BLD VENIPUNCTURE: CPT | Performed by: FAMILY MEDICINE

## 2025-03-19 NOTE — PATIENT INSTRUCTIONS
Take BP meds in the morning with your lunch.  Please check your blood pressure with your home cuff approximately 3-4 hours after the morning meds (approx 3pm-4pm) but before eating lunch.  Write it down in the log daily, along with the HR/Pulse.    If you notice the top number of the blood pressure being higher than 140s consistently, or less than 100 consistently, or if you feel headaches, dizziness, lightheaded, or chest pain, contact the clinic immediately.   If your HR is higher than 100 or less than 55 consistently and/or you feel palpitations, or like you might faint, again please let us know immediately.  If it is weekend, go to urgent care.   Call clinic at [phone] and ask for your doctors' nurse for assistance.      Take 2 extra strength Tylenol tablets twice daily as needed for hip pain (may take with meals).

## 2025-03-19 NOTE — PROGRESS NOTES
[facility]  [facility]    DOS: 3/19/2025      Subjective:  Chief Complaint:    Chief Complaint   Patient presents with    Follow-up     Here today for 3 mos check up, c/o bilat hip pain x 1 th,  denies trauma to area, ocurrs mostly in am       History of Present Illness:  Gladys Dominguez is a 80 y.o. female with a PMH of HTN, HLD, depression, insomnia, CAD (MI s/p PCTA 8/2020 on ASA), hx of PAF (after COVID, completed Eliquis).  Who presents today for: follow up of chronic conditions    Chronic bilateral hip pain  Worse when lying down. She is taking 1 tablet of extra strength Tylenol daily and OTC topical analgesic as needed for pain.  Denies falls but admits to difficulty with balance   Patient has not had recent XR. DEXA in 2024 showed normal BMD.    pAFib and CAD  Denies palpitations, CP, or SOB.  Cardiologist is Dr. Neo Lau.  Last visit >3 months ago per patient and will neto for appointment.  Admits to compliance with ASA, benazepril and atorvastatin.    HTN  BP not at goal.  Denies CP, SOB, or dizziness.  Patient did not take antihypertensive this morning.  Amenable to monitor BP at home with BP log.  Patient states she is due for follow up with cardiology. She will call for appointment.  Being followed by ophthalmology, Dr. Brantley for cataracts.      Health maintenance reviewed   MMG - patient declined further screening  CRC screening - colonoscopy scheduled 09/11/2025  Osteoporosis screening - DEXA scan done 10/10/24, within normal limits    Geriatric assessment:  No recent falls.  Appetite good.  Sleeps throughout the night with 1x awakening to urinate.  Urine and bowel function intact  Does not drive.    Social History     Socioeconomic History    Marital status:     Number of children: 3   Occupational History    Occupation: retired   Tobacco Use    Smoking status: Never     Passive exposure: Never    Smokeless tobacco: Never   Substance and  Sexual Activity    Alcohol use: Never    Drug use: Never    Sexual activity: Not Currently     Partners: Male     Social Drivers of Health     Financial Resource Strain: Low Risk  (12/6/2023)    Overall Financial Resource Strain (CARDIA)     Difficulty of Paying Living Expenses: Not hard at all   Food Insecurity: No Food Insecurity (12/6/2023)    Hunger Vital Sign     Worried About Running Out of Food in the Last Year: Never true     Ran Out of Food in the Last Year: Never true   Transportation Needs: No Transportation Needs (3/6/2024)    PRAPARE - Transportation     Lack of Transportation (Medical): No     Lack of Transportation (Non-Medical): No   Physical Activity: Inactive (12/6/2023)    Exercise Vital Sign     Days of Exercise per Week: 0 days     Minutes of Exercise per Session: 0 min   Stress: No Stress Concern Present (12/6/2023)    [employer] - Occupational Stress Questionnaire     Feeling of Stress : Not at all   Housing Stability: Low Risk  (12/6/2023)    Housing Stability Vital Sign     Unable to Pay for Housing in the Last Year: No     Number of Places Lived in the Last Year: 1     Unstable Housing in the Last Year: No        Review of patient's allergies indicates:  No Known Allergies     Current Outpatient Medications:     aspirin (ECOTRIN) 81 MG EC tablet, Take 81 mg by mouth once daily., Disp: , Rfl:     atorvastatin (LIPITOR) 80 MG tablet, Take 1 tablet (80 mg total) by mouth every evening., Disp: 90 tablet, Rfl: 1    benazepriL (LOTENSIN) 40 MG tablet, Take 1 tablet (40 mg total) by mouth once daily., Disp: 90 tablet, Rfl: 1    citalopram (CELEXA) 20 MG tablet, Take 1 tablet by mouth once daily, Disp: 90 tablet, Rfl: 1    dorzolamide-timolol 2-0.5% (COSOPT) 22.3-6.8 mg/mL ophthalmic solution, Place 1 drop into the left eye 2 (two) times daily., Disp: , Rfl:     ezetimibe (ZETIA) 10 mg tablet, Take 1 tablet (10 mg total) by mouth once daily., Disp: 90 tablet, Rfl: 1     "mirtazapine (REMERON) 15 MG tablet, Take 1 tablet (15 mg total) by mouth nightly as needed., Disp: 90 tablet, Rfl: 1    nitroGLYCERIN (NITROSTAT) 0.4 MG SL tablet, Place 0.4 mg under the tongue every 5 (five) minutes as needed for Chest pain., Disp: , Rfl:      Review of Systems:  Review of Systems   Constitutional:  Negative for chills, diaphoresis and fever.   Eyes:  Negative for blurred vision and double vision.   Respiratory:  Negative for cough and shortness of breath.    Cardiovascular:  Negative for chest pain.   Gastrointestinal:  Negative for abdominal pain, diarrhea, nausea and vomiting.   Musculoskeletal:  Positive for joint pain. Negative for falls.   Skin:  Negative for rash.   Neurological:  Negative for dizziness and headaches.         Objective:   Vitals:    03/19/25 1037 03/19/25 1042   BP: (!) 169/73 (!) 152/80   BP Location: Left arm Left arm   Patient Position: Sitting Sitting   Pulse: 68    Temp: 98.7 °F (37.1 °C)    TempSrc: Oral    SpO2: 98%    Weight: 80 kg (176 lb 5.9 oz)    Height: 5' 5" (1.651 m)        Physical Exam  Constitutional:       General: She is not in acute distress.     Appearance: Normal appearance.   HENT:      Head: Normocephalic and atraumatic.      Right Ear: External ear normal.      Left Ear: External ear normal.      Nose: Nose normal.      Mouth/Throat:      Mouth: Mucous membranes are moist.      Pharynx: Oropharynx is clear.   Eyes:      Extraocular Movements: Extraocular movements intact.      Conjunctiva/sclera: Conjunctivae normal.   Cardiovascular:      Rate and Rhythm: Normal rate. Rhythm irregularly irregular.      Heart sounds: Normal heart sounds. No murmur heard.     No friction rub. No gallop.   Pulmonary:      Effort: Pulmonary effort is normal. No respiratory distress.      Breath sounds: Normal breath sounds. No wheezing, rhonchi or rales.   Musculoskeletal:         General: Normal range of motion.      Cervical back: Normal range of motion and neck " supple.      Right hip: No tenderness, bony tenderness or crepitus. Normal range of motion. Normal strength.      Left hip: No tenderness, bony tenderness or crepitus. Normal range of motion. Normal strength.      Right upper leg: Tenderness and bony tenderness present. No swelling or edema.      Left upper leg: Tenderness and bony tenderness present. No swelling or edema.   Skin:     General: Skin is warm.      Findings: No rash.   Neurological:      General: No focal deficit present.      Mental Status: She is alert. Mental status is at baseline.   Psychiatric:         Mood and Affect: Mood normal.         Behavior: Behavior normal.         Thought Content: Thought content normal.        CBC:  Lab Results   Component Value Date    WBC 5.70 09/23/2024    RBC 3.41 (L) 09/23/2024    HGB 9.4 (L) 09/23/2024    HCT 27.9 (L) 09/23/2024    MCV 81.8 09/23/2024    MCH 27.6 09/23/2024    MCHC 33.7 09/23/2024    RDW 17.0 09/23/2024     09/23/2024    MPV 10.0 09/23/2024      CMP:  Sodium   Date Value Ref Range Status   09/23/2024 138 136 - 145 mmol/L Final   08/08/2020 137 136 - 145 mmol/L Final     Potassium   Date Value Ref Range Status   09/23/2024 4.0 3.5 - 5.1 mmol/L Final   08/08/2020 4.4 3.5 - 5.1 mmol/L Final     Chloride   Date Value Ref Range Status   09/23/2024 103 98 - 107 mmol/L Final   08/08/2020 104 100 - 109 mmol/L Final     CO2   Date Value Ref Range Status   09/23/2024 27 23 - 31 mmol/L Final     Carbon Dioxide   Date Value Ref Range Status   08/08/2020 24 22 - 33 mmol/L Final     Blood Urea Nitrogen   Date Value Ref Range Status   09/23/2024 20.8 (H) 9.8 - 20.1 mg/dL Final   08/08/2020 22 5 - 25 mg/dL Final     Creatinine   Date Value Ref Range Status   09/23/2024 1.07 (H) 0.55 - 1.02 mg/dL Final   08/08/2020 0.70 0.57 - 1.25 mg/dL Final     Calcium   Date Value Ref Range Status   09/23/2024 10.0 8.4 - 10.2 mg/dL Final   08/08/2020 8.6 (L) 8.8 - 10.6 mg/dL Final     Albumin   Date Value Ref Range  "Status   09/23/2024 3.9 3.4 - 4.8 g/dL Final     Bilirubin Total   Date Value Ref Range Status   09/23/2024 0.6 <=1.5 mg/dL Final     ALP   Date Value Ref Range Status   09/23/2024 42 40 - 150 unit/L Final     AST   Date Value Ref Range Status   09/23/2024 21 5 - 34 unit/L Final     ALT   Date Value Ref Range Status   09/23/2024 21 0 - 55 unit/L Final     Anion Gap   Date Value Ref Range Status   08/08/2020 9 8 - 16 mmol/L Final     eGFR    Date Value Ref Range Status   08/08/2020 99 >=60 mL/min/1.73mSq Final      BMP:  Lab Results   Component Value Date     09/23/2024    K 4.0 09/23/2024     09/23/2024    CO2 27 09/23/2024    BUN 20.8 (H) 09/23/2024    CREATININE 1.07 (H) 09/23/2024    CALCIUM 10.0 09/23/2024    ANIONGAP 9 08/08/2020    ESTGFRAFRICA 99 08/08/2020      Lipid Panel:  Lab Results   Component Value Date    CHOL 143 06/17/2024    CHOL 153 03/06/2024    CHOL 234 (H) 12/06/2023     Lab Results   Component Value Date    HDL 46 06/17/2024    HDL 50 03/06/2024    HDL 42 12/06/2023     No results found for: "LDLCALC"  Lab Results   Component Value Date    TRIG 101 06/17/2024    TRIG 113 03/06/2024    TRIG 151 (H) 12/06/2023     No results found for: "CHOLHDL"   HbA1c:  Lab Results   Component Value Date    HGBA1C 5.6 06/17/2024      TSH:  Lab Results   Component Value Date    TSH 1.854 03/15/2023     Assessment & Plan:    Gladys Dominguez is presenting as above and will be treated as follows:    1. Paroxysmal atrial fibrillation  - Vitamin D; Future  - TSH; Future  - IN OFFICE EKG 12-LEAD (to Muse)  Keep follow up with cardiology.  Previously completed Eliquis.  Continue ASA, Lipitor, benazepril and Zetia.  EKG ordered and pending.  Awaiting follow up appointment with cardiology.    2. Essential hypertension  - Vitamin D; Future  - TSH; Future  - Comprehensive Metabolic Panel; Future  - IN OFFICE EKG 12-LEAD (to Muse)  - CBC Auto Differential; Future  BP not at goal  Continue benazepril " and ASA.  Low sodium diet, avoid caffeine and exercise as tolerated.  Monitor BP at home, keep BP log and notify MD if sBP >160 or <90. Also notify MD if dBP >100 or <60.  Bring BP log to next visit.  Seek immediate medical treatment for chest pain, SOB, LE edema, severe headache, blurred vision, dizziness, slurred speech, any new or worsening symptoms.    3. Arteriosclerosis of coronary artery  - Vitamin D; Future  - TSH; Future  See #1.    4. Chronic kidney disease, stage 3a  - Vitamin D; Future  - Comprehensive Metabolic Panel; Future  - CBC Auto Differential; Future    5. Chronic pain of both hips  - X-Ray Hips Bilateral 2 View Incl AP Pelvis; Future  - Ambulatory referral/consult to Physical/Occupational Therapy; Future  Continue Tylenol as needed for pain.  Referral to PT at [facility].    6. Imbalance  - X-Ray Hips Bilateral 2 View Incl AP Pelvis; Future  - Ambulatory referral/consult to Physical/Occupational Therapy; Future  See #5.    Follow up in about 1 month (around 4/19/2025) for BP check, hip pain and pAFib.    Augusto Florence MD  Geriatrics Fellow/ Family medicine

## 2025-04-04 ENCOUNTER — TELEPHONE (OUTPATIENT)
Dept: NEUROLOGY | Facility: CLINIC | Age: 80
End: 2025-04-04
Payer: MEDICARE

## 2025-04-04 NOTE — TELEPHONE ENCOUNTER
Labs reviewed from encounter on 03/19/2025. Hgb/Hct stable and at baseline. Patient has chronic anemia. Patient is hypoglycemic. Recommend small meals throughout the day and may use protein supplements such as Boost and Ensure. TSH and Vitamin D WNL. Keep scheduled follow up on 04/16/2025. XR bilateral hips ordered during visit but appears to not have been completed. Patient can complete XR prior to upcoming visit.

## 2025-04-21 DIAGNOSIS — I10 ESSENTIAL HYPERTENSION: ICD-10-CM

## 2025-04-21 RX ORDER — BENAZEPRIL HYDROCHLORIDE 40 MG/1
40 TABLET ORAL
Qty: 90 TABLET | Refills: 1 | Status: SHIPPED | OUTPATIENT
Start: 2025-04-21

## 2025-07-09 DIAGNOSIS — F41.9 ANXIETY: ICD-10-CM

## 2025-07-10 RX ORDER — CITALOPRAM 20 MG/1
20 TABLET ORAL
Qty: 90 TABLET | Refills: 1 | Status: SHIPPED | OUTPATIENT
Start: 2025-07-10

## 2025-08-01 RX ORDER — SODIUM, POTASSIUM,MAG SULFATES 17.5-3.13G
SOLUTION, RECONSTITUTED, ORAL ORAL
Qty: 1 KIT | Refills: 1 | Status: SHIPPED | OUTPATIENT
Start: 2025-08-01

## 2025-08-13 ENCOUNTER — OFFICE VISIT (OUTPATIENT)
Dept: FAMILY MEDICINE | Facility: CLINIC | Age: 80
End: 2025-08-13
Payer: MEDICARE

## 2025-08-13 VITALS
SYSTOLIC BLOOD PRESSURE: 138 MMHG | BODY MASS INDEX: 29.82 KG/M2 | HEART RATE: 64 BPM | DIASTOLIC BLOOD PRESSURE: 78 MMHG | WEIGHT: 179 LBS | TEMPERATURE: 97 F | OXYGEN SATURATION: 100 % | HEIGHT: 65 IN

## 2025-08-13 DIAGNOSIS — R39.15 URGENCY OF URINATION: Primary | ICD-10-CM

## 2025-08-13 DIAGNOSIS — R26.89 IMBALANCE: ICD-10-CM

## 2025-08-13 DIAGNOSIS — I48.0 PAROXYSMAL ATRIAL FIBRILLATION: ICD-10-CM

## 2025-08-13 DIAGNOSIS — G89.29 CHRONIC PAIN OF BOTH HIPS: ICD-10-CM

## 2025-08-13 DIAGNOSIS — I25.10 ARTERIOSCLEROSIS OF CORONARY ARTERY: ICD-10-CM

## 2025-08-13 DIAGNOSIS — I10 ESSENTIAL HYPERTENSION: ICD-10-CM

## 2025-08-13 DIAGNOSIS — Z23 NEED FOR ZOSTER VACCINE: ICD-10-CM

## 2025-08-13 DIAGNOSIS — R73.01 IMPAIRED FASTING GLUCOSE: ICD-10-CM

## 2025-08-13 DIAGNOSIS — M25.552 CHRONIC PAIN OF BOTH HIPS: ICD-10-CM

## 2025-08-13 DIAGNOSIS — M25.551 CHRONIC PAIN OF BOTH HIPS: ICD-10-CM

## 2025-08-13 DIAGNOSIS — G47.00 INSOMNIA, UNSPECIFIED TYPE: ICD-10-CM

## 2025-08-13 DIAGNOSIS — R30.0 DYSURIA: ICD-10-CM

## 2025-08-13 LAB
BACTERIA #/AREA URNS AUTO: ABNORMAL /HPF
BILIRUB UR QL STRIP.AUTO: NEGATIVE
CHOLEST SERPL-MCNC: 240 MG/DL
CHOLEST/HDLC SERPL: 5 {RATIO} (ref 0–5)
CLARITY UR: CLEAR
COLOR UR AUTO: ABNORMAL
EST. AVERAGE GLUCOSE BLD GHB EST-MCNC: 114 MG/DL
GLUCOSE UR QL STRIP: NORMAL
HBA1C MFR BLD: 5.6 %
HDLC SERPL-MCNC: 47 MG/DL (ref 35–60)
HGB UR QL STRIP: NEGATIVE
HYALINE CASTS #/AREA URNS LPF: ABNORMAL /LPF
KETONES UR QL STRIP: NEGATIVE
LDLC SERPL CALC-MCNC: 168 MG/DL (ref 50–140)
LEUKOCYTE ESTERASE UR QL STRIP: NEGATIVE
NITRITE UR QL STRIP: NEGATIVE
PH UR STRIP: 6.5 [PH]
PROT UR QL STRIP: NEGATIVE
RBC #/AREA URNS AUTO: ABNORMAL /HPF
SP GR UR STRIP.AUTO: 1.01 (ref 1–1.03)
SQUAMOUS #/AREA URNS LPF: ABNORMAL /HPF
TRIGL SERPL-MCNC: 124 MG/DL (ref 37–140)
UROBILINOGEN UR STRIP-ACNC: NORMAL
VLDLC SERPL CALC-MCNC: 25 MG/DL
WBC #/AREA URNS AUTO: ABNORMAL /HPF

## 2025-08-13 PROCEDURE — 83036 HEMOGLOBIN GLYCOSYLATED A1C: CPT | Performed by: FAMILY MEDICINE

## 2025-08-13 PROCEDURE — 99214 OFFICE O/P EST MOD 30 MIN: CPT | Mod: PBBFAC | Performed by: FAMILY MEDICINE

## 2025-08-13 PROCEDURE — 80061 LIPID PANEL: CPT | Performed by: FAMILY MEDICINE

## 2025-08-13 PROCEDURE — 81001 URINALYSIS AUTO W/SCOPE: CPT | Performed by: FAMILY MEDICINE

## 2025-08-13 RX ORDER — MIRTAZAPINE 15 MG/1
15 TABLET, FILM COATED ORAL NIGHTLY PRN
Qty: 90 TABLET | Refills: 1 | Status: SHIPPED | OUTPATIENT
Start: 2025-08-13

## 2025-08-13 RX ORDER — NITROFURANTOIN 25; 75 MG/1; MG/1
100 CAPSULE ORAL 2 TIMES DAILY
Qty: 14 CAPSULE | Refills: 0 | Status: SHIPPED | OUTPATIENT
Start: 2025-08-13 | End: 2025-08-20